# Patient Record
Sex: FEMALE | Race: WHITE | Employment: UNEMPLOYED | URBAN - NONMETROPOLITAN AREA
[De-identification: names, ages, dates, MRNs, and addresses within clinical notes are randomized per-mention and may not be internally consistent; named-entity substitution may affect disease eponyms.]

---

## 2019-10-09 ENCOUNTER — OFFICE VISIT (OUTPATIENT)
Dept: FAMILY MEDICINE CLINIC | Age: 49
End: 2019-10-09
Payer: COMMERCIAL

## 2019-10-09 VITALS
HEART RATE: 84 BPM | DIASTOLIC BLOOD PRESSURE: 62 MMHG | SYSTOLIC BLOOD PRESSURE: 128 MMHG | BODY MASS INDEX: 17.27 KG/M2 | HEIGHT: 67 IN | OXYGEN SATURATION: 98 % | WEIGHT: 110 LBS

## 2019-10-09 DIAGNOSIS — Z87.820 HISTORY OF TRAUMATIC BRAIN INJURY: ICD-10-CM

## 2019-10-09 DIAGNOSIS — M25.50 ARTHRALGIA OF MULTIPLE JOINTS: ICD-10-CM

## 2019-10-09 DIAGNOSIS — L81.9 DISCOLORATION OF SKIN OF FOOT: ICD-10-CM

## 2019-10-09 DIAGNOSIS — R41.3 MEMORY DEFICIT: ICD-10-CM

## 2019-10-09 DIAGNOSIS — E55.9 VITAMIN D DEFICIENCY: ICD-10-CM

## 2019-10-09 DIAGNOSIS — G89.29 CHRONIC NONINTRACTABLE HEADACHE, UNSPECIFIED HEADACHE TYPE: Primary | ICD-10-CM

## 2019-10-09 DIAGNOSIS — Z13.29 SCREENING FOR THYROID DISORDER: ICD-10-CM

## 2019-10-09 DIAGNOSIS — R51.9 CHRONIC NONINTRACTABLE HEADACHE, UNSPECIFIED HEADACHE TYPE: Primary | ICD-10-CM

## 2019-10-09 DIAGNOSIS — Z13.1 SCREENING FOR DIABETES MELLITUS: ICD-10-CM

## 2019-10-09 DIAGNOSIS — Z13.220 SCREENING FOR LIPOID DISORDERS: ICD-10-CM

## 2019-10-09 PROCEDURE — G8427 DOCREV CUR MEDS BY ELIG CLIN: HCPCS | Performed by: FAMILY MEDICINE

## 2019-10-09 PROCEDURE — 3017F COLORECTAL CA SCREEN DOC REV: CPT | Performed by: FAMILY MEDICINE

## 2019-10-09 PROCEDURE — 4004F PT TOBACCO SCREEN RCVD TLK: CPT | Performed by: FAMILY MEDICINE

## 2019-10-09 PROCEDURE — G8484 FLU IMMUNIZE NO ADMIN: HCPCS | Performed by: FAMILY MEDICINE

## 2019-10-09 PROCEDURE — G8419 CALC BMI OUT NRM PARAM NOF/U: HCPCS | Performed by: FAMILY MEDICINE

## 2019-10-09 PROCEDURE — 99213 OFFICE O/P EST LOW 20 MIN: CPT | Performed by: FAMILY MEDICINE

## 2019-10-09 RX ORDER — ALPRAZOLAM 0.5 MG/1
1 TABLET ORAL 2 TIMES DAILY
COMMUNITY
Start: 2017-08-25 | End: 2019-10-16 | Stop reason: SDUPTHER

## 2019-10-09 RX ORDER — DEXTROAMPHETAMINE SACCHARATE, AMPHETAMINE ASPARTATE, DEXTROAMPHETAMINE SULFATE AND AMPHETAMINE SULFATE 5; 5; 5; 5 MG/1; MG/1; MG/1; MG/1
1 TABLET ORAL 2 TIMES DAILY
COMMUNITY
Start: 2017-09-06 | End: 2019-11-04 | Stop reason: SDUPTHER

## 2019-10-09 RX ORDER — DULOXETIN HYDROCHLORIDE 60 MG/1
60 CAPSULE, DELAYED RELEASE ORAL DAILY
COMMUNITY
End: 2019-11-15 | Stop reason: SDUPTHER

## 2019-10-09 RX ORDER — GABAPENTIN 300 MG/1
1 CAPSULE ORAL 3 TIMES DAILY
COMMUNITY
Start: 2017-09-15 | End: 2019-12-12 | Stop reason: SDUPTHER

## 2019-10-09 RX ORDER — LEVOTHYROXINE SODIUM 175 UG/1
1 TABLET ORAL DAILY
Status: ON HOLD | COMMUNITY
End: 2020-05-31 | Stop reason: SDUPTHER

## 2019-10-09 RX ORDER — TOPIRAMATE 100 MG/1
100 TABLET, FILM COATED ORAL NIGHTLY
COMMUNITY
End: 2020-03-18 | Stop reason: SDUPTHER

## 2019-10-09 RX ORDER — TOPIRAMATE 50 MG/1
50 TABLET, FILM COATED ORAL DAILY
COMMUNITY
Start: 2017-04-24 | End: 2020-03-18 | Stop reason: SDUPTHER

## 2019-10-09 RX ORDER — SUMATRIPTAN 100 MG/1
TABLET, FILM COATED ORAL
COMMUNITY
End: 2019-10-16 | Stop reason: SDUPTHER

## 2019-10-09 RX ORDER — TIZANIDINE 2 MG/1
2 TABLET ORAL DAILY
COMMUNITY
End: 2019-10-16 | Stop reason: SDUPTHER

## 2019-10-09 RX ORDER — CHOLECALCIFEROL (VITAMIN D3) 1250 MCG
1 CAPSULE ORAL WEEKLY
COMMUNITY
End: 2019-12-12 | Stop reason: SDUPTHER

## 2019-10-09 ASSESSMENT — PATIENT HEALTH QUESTIONNAIRE - PHQ9
SUM OF ALL RESPONSES TO PHQ QUESTIONS 1-9: 0
2. FEELING DOWN, DEPRESSED OR HOPELESS: 0
1. LITTLE INTEREST OR PLEASURE IN DOING THINGS: 0
SUM OF ALL RESPONSES TO PHQ QUESTIONS 1-9: 0
SUM OF ALL RESPONSES TO PHQ9 QUESTIONS 1 & 2: 0

## 2019-10-16 DIAGNOSIS — G47.9 SLEEP DIFFICULTIES: Primary | ICD-10-CM

## 2019-10-17 RX ORDER — ALPRAZOLAM 0.5 MG/1
0.5 TABLET ORAL 2 TIMES DAILY PRN
Qty: 60 TABLET | Refills: 0 | Status: SHIPPED | OUTPATIENT
Start: 2019-10-17 | End: 2019-12-13 | Stop reason: SDUPTHER

## 2019-10-17 RX ORDER — SUMATRIPTAN 100 MG/1
TABLET, FILM COATED ORAL
Qty: 9 TABLET | Refills: 2 | Status: SHIPPED | OUTPATIENT
Start: 2019-10-17 | End: 2020-01-11 | Stop reason: SDUPTHER

## 2019-10-17 RX ORDER — TIZANIDINE 2 MG/1
2 TABLET ORAL DAILY PRN
Qty: 30 TABLET | Refills: 1 | Status: ON HOLD | OUTPATIENT
Start: 2019-10-17 | End: 2020-05-31 | Stop reason: SDUPTHER

## 2019-10-28 ENCOUNTER — TELEPHONE (OUTPATIENT)
Dept: FAMILY MEDICINE CLINIC | Age: 49
End: 2019-10-28

## 2019-10-28 DIAGNOSIS — Z87.820 HISTORY OF TRAUMATIC BRAIN INJURY: Primary | ICD-10-CM

## 2019-11-03 ASSESSMENT — ENCOUNTER SYMPTOMS: COLOR CHANGE: 1

## 2019-11-04 RX ORDER — DEXTROAMPHETAMINE SACCHARATE, AMPHETAMINE ASPARTATE, DEXTROAMPHETAMINE SULFATE AND AMPHETAMINE SULFATE 5; 5; 5; 5 MG/1; MG/1; MG/1; MG/1
20 TABLET ORAL 2 TIMES DAILY
Qty: 60 TABLET | Refills: 0 | Status: SHIPPED | OUTPATIENT
Start: 2019-11-04 | End: 2019-12-13 | Stop reason: SDUPTHER

## 2019-11-12 DIAGNOSIS — F33.9 EPISODE OF RECURRENT MAJOR DEPRESSIVE DISORDER, UNSPECIFIED DEPRESSION EPISODE SEVERITY (HCC): Primary | ICD-10-CM

## 2019-11-12 RX ORDER — SUMATRIPTAN 100 MG/1
TABLET, FILM COATED ORAL
Qty: 9 TABLET | Refills: 2 | Status: CANCELLED | OUTPATIENT
Start: 2019-11-12

## 2019-11-15 RX ORDER — SUMATRIPTAN 100 MG/1
TABLET, FILM COATED ORAL
Qty: 9 TABLET | Refills: 2 | Status: CANCELLED | OUTPATIENT
Start: 2019-11-15

## 2019-11-15 RX ORDER — DULOXETIN HYDROCHLORIDE 60 MG/1
60 CAPSULE, DELAYED RELEASE ORAL DAILY
Qty: 30 CAPSULE | Refills: 0 | Status: SHIPPED | OUTPATIENT
Start: 2019-11-15 | End: 2020-01-08

## 2019-11-22 ENCOUNTER — TELEPHONE (OUTPATIENT)
Dept: FAMILY MEDICINE CLINIC | Age: 49
End: 2019-11-22

## 2019-11-22 NOTE — TELEPHONE ENCOUNTER
As noted in phone note on 10/28 (as stated below), I have already stated that I could not complete this form for her. She had stated she was possibly going to contact her previous PCP, but I do not see anything in [facility] she will drive to [place] [facility]  the [address] form & take it to her previous Dr if you don't want to be liable for signing it. \") in phone note from 11/4. Also, when contacted before that I could not complete this for her, nurse wrote that \"pt did say when she applied for re-licensure after her head injury, she had to complete a functional capacity exam, go through testing, & be evaluated by Neurology. \"  She may need to do this again. Pt has verbally hostile on the phone on multiple occasions now - in phone note below, and also in phone note from 10/28 (but on 11/1), it was noted \"Patient calling again stating \"I just do not understand why the fuck Dr. Leyda Richter will not give me my adderall, do she want me to commit suicide\". Explained to patient that if she feels like she might harm herself she should go to the ER to be seen. Patient denies wanting to harm herself. Patient states that has chronic fatigue and does not feel like lifting a finger. Then patient asks if KB with write her for a lower dosage and wean her off. Patient states Jeraldmargot Haris won't she wean me off my adderall this is just fucking crazy\". \"  She did call back on 11/4 and apologize. She then called on 11/12, and stated \"Pt wanting a list of all meds that KB will prescribe for her until she finds a new physician. \"  However, she is still scheduled to see me for f/u on 12/4.

## 2019-12-12 DIAGNOSIS — E55.9 VITAMIN D DEFICIENCY: Primary | ICD-10-CM

## 2019-12-12 DIAGNOSIS — Z87.820 HISTORY OF TRAUMATIC BRAIN INJURY: ICD-10-CM

## 2019-12-12 DIAGNOSIS — G47.9 SLEEP DIFFICULTIES: ICD-10-CM

## 2019-12-13 RX ORDER — DEXTROAMPHETAMINE SACCHARATE, AMPHETAMINE ASPARTATE, DEXTROAMPHETAMINE SULFATE AND AMPHETAMINE SULFATE 5; 5; 5; 5 MG/1; MG/1; MG/1; MG/1
20 TABLET ORAL 2 TIMES DAILY
Qty: 60 TABLET | Refills: 0 | Status: SHIPPED | OUTPATIENT
Start: 2019-12-13 | End: 2020-01-10 | Stop reason: SDUPTHER

## 2019-12-13 RX ORDER — GABAPENTIN 300 MG/1
300 CAPSULE ORAL 3 TIMES DAILY
Qty: 90 CAPSULE | Refills: 0 | Status: SHIPPED | OUTPATIENT
Start: 2019-12-13 | End: 2020-05-13 | Stop reason: SDUPTHER

## 2019-12-13 RX ORDER — ALPRAZOLAM 0.5 MG/1
0.5 TABLET ORAL 2 TIMES DAILY PRN
Qty: 60 TABLET | Refills: 0 | Status: SHIPPED | OUTPATIENT
Start: 2019-12-13 | End: 2020-02-11 | Stop reason: SDUPTHER

## 2019-12-13 RX ORDER — CHOLECALCIFEROL (VITAMIN D3) 1250 MCG
1 CAPSULE ORAL WEEKLY
Qty: 4 CAPSULE | Refills: 1 | Status: SHIPPED | OUTPATIENT
Start: 2019-12-13 | End: 2020-02-13 | Stop reason: SDUPTHER

## 2019-12-25 ENCOUNTER — APPOINTMENT (OUTPATIENT)
Dept: GENERAL RADIOLOGY | Age: 49
End: 2019-12-25
Payer: COMMERCIAL

## 2019-12-25 ENCOUNTER — HOSPITAL ENCOUNTER (EMERGENCY)
Age: 49
Discharge: HOME | End: 2019-12-25
Attending: EMERGENCY MEDICINE
Payer: COMMERCIAL

## 2019-12-25 VITALS
BODY MASS INDEX: 15.7 KG/M2 | HEART RATE: 72 BPM | OXYGEN SATURATION: 100 % | WEIGHT: 100 LBS | TEMPERATURE: 98.1 F | SYSTOLIC BLOOD PRESSURE: 95 MMHG | RESPIRATION RATE: 14 BRPM | HEIGHT: 67 IN | DIASTOLIC BLOOD PRESSURE: 56 MMHG

## 2019-12-25 DIAGNOSIS — R07.9 CHEST PAIN, UNSPECIFIED TYPE: Primary | ICD-10-CM

## 2019-12-25 LAB
ABSOLUTE EOS #: 0.1 K/UL (ref 0–0.4)
ABSOLUTE IMMATURE GRANULOCYTE: ABNORMAL K/UL (ref 0–0.3)
ABSOLUTE LYMPH #: 1.1 K/UL (ref 1–4.8)
ABSOLUTE MONO #: 0.6 K/UL (ref 0.1–1.3)
ALBUMIN SERPL-MCNC: 4.2 G/DL (ref 3.5–5.2)
ALBUMIN/GLOBULIN RATIO: ABNORMAL (ref 1–2.5)
ALP BLD-CCNC: 65 U/L (ref 35–104)
ALT SERPL-CCNC: 15 U/L (ref 5–33)
ANION GAP SERPL CALCULATED.3IONS-SCNC: 13 MMOL/L (ref 9–17)
AST SERPL-CCNC: 16 U/L
BASOPHILS # BLD: 1 % (ref 0–2)
BASOPHILS ABSOLUTE: 0 K/UL (ref 0–0.2)
BILIRUB SERPL-MCNC: 0.18 MG/DL (ref 0.3–1.2)
BUN BLDV-MCNC: 12 MG/DL (ref 6–20)
BUN/CREAT BLD: ABNORMAL (ref 9–20)
CALCIUM SERPL-MCNC: 9.5 MG/DL (ref 8.6–10.4)
CHLORIDE BLD-SCNC: 105 MMOL/L (ref 98–107)
CO2: 23 MMOL/L (ref 20–31)
CREAT SERPL-MCNC: 0.46 MG/DL (ref 0.5–0.9)
DIFFERENTIAL TYPE: ABNORMAL
EOSINOPHILS RELATIVE PERCENT: 2 % (ref 0–4)
GFR AFRICAN AMERICAN: >60 ML/MIN
GFR NON-AFRICAN AMERICAN: >60 ML/MIN
GFR SERPL CREATININE-BSD FRML MDRD: ABNORMAL ML/MIN/{1.73_M2}
GFR SERPL CREATININE-BSD FRML MDRD: ABNORMAL ML/MIN/{1.73_M2}
GLUCOSE BLD-MCNC: 95 MG/DL (ref 70–99)
HCT VFR BLD CALC: 39.1 % (ref 36–46)
HEMOGLOBIN: 12.9 G/DL (ref 12–16)
IMMATURE GRANULOCYTES: ABNORMAL %
LYMPHOCYTES # BLD: 17 % (ref 24–44)
MCH RBC QN AUTO: 31.7 PG (ref 26–34)
MCHC RBC AUTO-ENTMCNC: 33 G/DL (ref 31–37)
MCV RBC AUTO: 96.1 FL (ref 80–100)
MONOCYTES # BLD: 8 % (ref 1–7)
NRBC AUTOMATED: ABNORMAL PER 100 WBC
PDW BLD-RTO: 13.5 % (ref 11.5–14.9)
PLATELET # BLD: 237 K/UL (ref 150–450)
PLATELET ESTIMATE: ABNORMAL
PMV BLD AUTO: 9 FL (ref 6–12)
POTASSIUM SERPL-SCNC: 3.5 MMOL/L (ref 3.7–5.3)
RBC # BLD: 4.07 M/UL (ref 4–5.2)
RBC # BLD: ABNORMAL 10*6/UL
SEG NEUTROPHILS: 72 % (ref 36–66)
SEGMENTED NEUTROPHILS ABSOLUTE COUNT: 5 K/UL (ref 1.3–9.1)
SODIUM BLD-SCNC: 141 MMOL/L (ref 135–144)
TOTAL PROTEIN: 7.3 G/DL (ref 6.4–8.3)
TROPONIN INTERP: NORMAL
TROPONIN T: NORMAL NG/ML
TROPONIN, HIGH SENSITIVITY: <6 NG/L (ref 0–14)
WBC # BLD: 6.9 K/UL (ref 3.5–11)
WBC # BLD: ABNORMAL 10*3/UL

## 2019-12-25 PROCEDURE — 99285 EMERGENCY DEPT VISIT HI MDM: CPT

## 2019-12-25 PROCEDURE — 36415 COLL VENOUS BLD VENIPUNCTURE: CPT

## 2019-12-25 PROCEDURE — 85025 COMPLETE CBC W/AUTO DIFF WBC: CPT

## 2019-12-25 PROCEDURE — 80053 COMPREHEN METABOLIC PANEL: CPT

## 2019-12-25 PROCEDURE — 84484 ASSAY OF TROPONIN QUANT: CPT

## 2019-12-25 PROCEDURE — 6370000000 HC RX 637 (ALT 250 FOR IP): Performed by: STUDENT IN AN ORGANIZED HEALTH CARE EDUCATION/TRAINING PROGRAM

## 2019-12-25 PROCEDURE — 93005 ELECTROCARDIOGRAM TRACING: CPT | Performed by: STUDENT IN AN ORGANIZED HEALTH CARE EDUCATION/TRAINING PROGRAM

## 2019-12-25 PROCEDURE — 71046 X-RAY EXAM CHEST 2 VIEWS: CPT

## 2019-12-25 RX ORDER — ASPIRIN 325 MG
325 TABLET ORAL ONCE
Status: COMPLETED | OUTPATIENT
Start: 2019-12-25 | End: 2019-12-25

## 2019-12-25 RX ORDER — BENZONATATE 100 MG/1
100 CAPSULE ORAL 3 TIMES DAILY PRN
Qty: 30 CAPSULE | Refills: 0 | Status: SHIPPED | OUTPATIENT
Start: 2019-12-25 | End: 2020-01-01

## 2019-12-25 RX ADMIN — ASPIRIN 325 MG ORAL TABLET 325 MG: 325 PILL ORAL at 20:02

## 2019-12-25 ASSESSMENT — PAIN DESCRIPTION - LOCATION: LOCATION: BREAST;CHEST

## 2019-12-25 ASSESSMENT — PAIN DESCRIPTION - PAIN TYPE: TYPE: ACUTE PAIN

## 2019-12-25 ASSESSMENT — HEART SCORE: ECG: 0

## 2019-12-25 ASSESSMENT — PAIN SCALES - GENERAL: PAINLEVEL_OUTOF10: 9

## 2019-12-25 ASSESSMENT — PAIN DESCRIPTION - DESCRIPTORS: DESCRIPTORS: SHARP

## 2019-12-25 ASSESSMENT — PAIN DESCRIPTION - ORIENTATION: ORIENTATION: LEFT

## 2019-12-26 LAB
EKG ATRIAL RATE: 66 BPM
EKG P AXIS: 70 DEGREES
EKG P-R INTERVAL: 134 MS
EKG Q-T INTERVAL: 434 MS
EKG QRS DURATION: 102 MS
EKG QTC CALCULATION (BAZETT): 454 MS
EKG R AXIS: 85 DEGREES
EKG T AXIS: 68 DEGREES
EKG VENTRICULAR RATE: 66 BPM

## 2020-01-08 RX ORDER — DULOXETIN HYDROCHLORIDE 60 MG/1
60 CAPSULE, DELAYED RELEASE ORAL DAILY
Qty: 30 CAPSULE | Refills: 0 | Status: SHIPPED | OUTPATIENT
Start: 2020-01-08 | End: 2020-05-13 | Stop reason: SDUPTHER

## 2020-01-10 NOTE — TELEPHONE ENCOUNTER
Next appt 1-27-20. Pt should not be out of Adderall yet. OARRS reviewed. Last filled Jan 14 for #60.

## 2020-01-11 RX ORDER — SUMATRIPTAN 100 MG/1
TABLET, FILM COATED ORAL
Qty: 9 TABLET | Refills: 2 | Status: SHIPPED | OUTPATIENT
Start: 2020-01-11 | End: 2020-05-07 | Stop reason: SDUPTHER

## 2020-01-13 RX ORDER — DEXTROAMPHETAMINE SACCHARATE, AMPHETAMINE ASPARTATE, DEXTROAMPHETAMINE SULFATE AND AMPHETAMINE SULFATE 5; 5; 5; 5 MG/1; MG/1; MG/1; MG/1
20 TABLET ORAL 2 TIMES DAILY
Qty: 60 TABLET | Refills: 0 | Status: SHIPPED | OUTPATIENT
Start: 2020-01-13 | End: 2020-02-11 | Stop reason: SDUPTHER

## 2020-01-13 NOTE — TELEPHONE ENCOUNTER
Controlled Substance Monitoring:    Acute and Chronic Pain Monitoring:   RX Monitoring 1/13/2020   Periodic Controlled Substance Monitoring No signs of potential drug abuse or diversion identified.

## 2020-01-14 RX ORDER — SUMATRIPTAN 100 MG/1
TABLET, FILM COATED ORAL
Qty: 9 TABLET | Refills: 2 | OUTPATIENT
Start: 2020-01-14

## 2020-01-14 RX ORDER — DEXTROAMPHETAMINE SACCHARATE, AMPHETAMINE ASPARTATE, DEXTROAMPHETAMINE SULFATE AND AMPHETAMINE SULFATE 5; 5; 5; 5 MG/1; MG/1; MG/1; MG/1
TABLET ORAL
Qty: 60 TABLET | OUTPATIENT
Start: 2020-01-14

## 2020-02-13 RX ORDER — CHOLECALCIFEROL (VITAMIN D3) 1250 MCG
1 CAPSULE ORAL WEEKLY
Qty: 4 CAPSULE | Refills: 2 | Status: ON HOLD | OUTPATIENT
Start: 2020-02-13 | End: 2020-05-31 | Stop reason: SDUPTHER

## 2020-02-13 RX ORDER — DEXTROAMPHETAMINE SACCHARATE, AMPHETAMINE ASPARTATE, DEXTROAMPHETAMINE SULFATE AND AMPHETAMINE SULFATE 5; 5; 5; 5 MG/1; MG/1; MG/1; MG/1
20 TABLET ORAL 2 TIMES DAILY
Qty: 60 TABLET | Refills: 0 | Status: SHIPPED | OUTPATIENT
Start: 2020-02-13 | End: 2020-03-18 | Stop reason: SDUPTHER

## 2020-02-13 RX ORDER — ALPRAZOLAM 0.5 MG/1
0.5 TABLET ORAL 2 TIMES DAILY PRN
Qty: 60 TABLET | Refills: 0 | Status: SHIPPED | OUTPATIENT
Start: 2020-02-13 | End: 2020-05-06 | Stop reason: SDUPTHER

## 2020-02-13 NOTE — TELEPHONE ENCOUNTER
Controlled Substance Monitoring:    Acute and Chronic Pain Monitoring:   RX Monitoring 2/13/2020   Periodic Controlled Substance Monitoring No signs of potential drug abuse or diversion identified.

## 2020-03-06 ENCOUNTER — TELEPHONE (OUTPATIENT)
Dept: FAMILY MEDICINE CLINIC | Age: 50
End: 2020-03-06

## 2020-03-06 ENCOUNTER — OFFICE VISIT (OUTPATIENT)
Dept: FAMILY MEDICINE CLINIC | Age: 50
End: 2020-03-06
Payer: COMMERCIAL

## 2020-03-06 VITALS
OXYGEN SATURATION: 98 % | HEIGHT: 67 IN | BODY MASS INDEX: 17.27 KG/M2 | HEART RATE: 71 BPM | WEIGHT: 110 LBS | SYSTOLIC BLOOD PRESSURE: 122 MMHG | DIASTOLIC BLOOD PRESSURE: 62 MMHG

## 2020-03-06 PROCEDURE — 99214 OFFICE O/P EST MOD 30 MIN: CPT | Performed by: FAMILY MEDICINE

## 2020-03-06 PROCEDURE — G8427 DOCREV CUR MEDS BY ELIG CLIN: HCPCS | Performed by: FAMILY MEDICINE

## 2020-03-06 PROCEDURE — 3017F COLORECTAL CA SCREEN DOC REV: CPT | Performed by: FAMILY MEDICINE

## 2020-03-06 PROCEDURE — 4004F PT TOBACCO SCREEN RCVD TLK: CPT | Performed by: FAMILY MEDICINE

## 2020-03-06 PROCEDURE — 99212 OFFICE O/P EST SF 10 MIN: CPT

## 2020-03-06 PROCEDURE — G8484 FLU IMMUNIZE NO ADMIN: HCPCS | Performed by: FAMILY MEDICINE

## 2020-03-06 PROCEDURE — G8419 CALC BMI OUT NRM PARAM NOF/U: HCPCS | Performed by: FAMILY MEDICINE

## 2020-03-06 RX ORDER — SUMATRIPTAN 100 MG/1
TABLET, FILM COATED ORAL
Qty: 9 TABLET | Refills: 2 | Status: CANCELLED | OUTPATIENT
Start: 2020-03-06

## 2020-03-06 NOTE — PROGRESS NOTES
[facility] [address]  [address], [address] [place]  [phone]      Laura Deutsch is a 44 y.o. female who presents today for her medical conditions/complaints as noted below. Kalen Carmona is c/o of Gait Problem (episodic ataxia, per pt)      HPI:     Pt sees Kajal Zarate for counseling and was referred to me by Clementina Chester. Is in today for ataxia. Pt voices she was never diagnosed, but was told by her last partner that she had signs of ataxia.    voices her muscles were constricting, ability to control diaphragm, affecting breathing. Episodes would last about 10 min. Most recent episode being 1 week ago. Not sure if having panic attacks. 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 Lost her hearing, R > L. Wears hearing aides in both ears, but lost her L one 2 months ago. Has tinnitus now as well.     Has not seen the neurologist for further testing. Would like to get testing done, \"proactively. \"   Would like to see a neurologist closer to home,[place], for the time being. Referral will be placed. Would like to have paperwork for 's license reconsidered. Did have license suspended. Agreement will be to have testing done prior to reconsideration. Taking several medications for HA's, pain, and concentration/memory - was getting all medications from Dr. Mj Dhaliwal. Believes that previous physician claimed her as \"instability in adult. \"      Denied for disability - used to be a . Recently split from her partner, who was a healthcare professional. Was dependant on her financially and mentally. Voices that she has PTSD from the relationship, is startled easily. Finds herself homeless presently, living in an RV. Has found an RV park in [address] that she plans on staying in.     Pt voices that she has to take adderall to due daily functions and maintain a quality of life. Finds herself with no energy. Is under the understanding that will need to be seen every 3 months due to being a controlled substance: xanax, adderall and gabapentin. Uses the xanax and cymbalta at HS. No past medical history on file. No past surgical history on file. No family history on file. Social History     Tobacco Use    Smoking status: Current Every Day Smoker     Packs/day: 0.50     Years: 3.00     Pack years: 1.50     Types: Cigarettes    Smokeless tobacco: Never Used   Substance Use Topics    Alcohol use: Not on file      Current Outpatient Medications   Medication Sig Dispense Refill    Cholecalciferol (VITAMIN D3) 1.25 MG (48132 UT) CAPS Take 1 capsule by mouth once a week 4 capsule 2    SUMAtriptan (IMITREX) 100 MG tablet Take 1 tab by mouth once as needed for migraine; may repeat dose 2 hours later, if needed. Max dose: 2 tabs/24 hours. 9 tablet 2    DULoxetine (CYMBALTA) 60 MG extended release capsule TAKE 1 CAPSULE BY MOUTH DAILY 30 capsule 0    tiZANidine (ZANAFLEX) 2 MG tablet Take 1 tablet by mouth daily as needed (muscle pain) 30 tablet 1    FOLIC ACID PO Take 1 tablet by mouth daily      levothyroxine (SYNTHROID) 175 MCG tablet 1 tablet daily      topiramate (TOPAMAX) 100 MG tablet Take 100 mg by mouth nightly      topiramate (TOPAMAX) 50 MG tablet 50 mg daily      amphetamine-dextroamphetamine (ADDERALL) 20 MG tablet Take 1 tablet by mouth 2 times daily for 30 days. 60 tablet 0    gabapentin (NEURONTIN) 300 MG capsule Take 1 capsule by mouth 3 times daily for 30 days. Takes 300 mg AM; 600 mg HS 90 capsule 0     No current facility-administered medications for this visit.       No Known Allergies    Health Maintenance   Topic Date Due    Pneumococcal 0-64 years Vaccine (1 of 1 - PPSV23) 08/28/1974    Cervical cancer screen  08/28/1989    Lipid screen  05/12/2017    Breast cancer screen  08/28/2018    Colon cancer screen colonoscopy Jenny Winston MD, Neurology, [facility]   3. Episode of recurrent major depressive disorder, unspecified depression episode severity ([facility] [address]     4. Sleep difficulties     5. Episode of transient neurologic symptoms  Reyes Cash MD, Neurology, [facility]         Plan:      Return if symptoms worsen or fail to improve. Orders Placed This Encounter   Procedures   Reyes Cash MD, Neurology, [facility]     Referral Priority:   Routine     Referral Type:   Eval and Treat     Referral Reason:   Specialty Services Required     Referred to Provider:   Cami Segal MD     Requested Specialty:   Neurology     Number of Visits Requested:   1     No orders of the defined types were placed in this encounter. Patient given educational materials - see patient instructions. Discussed use, benefit, and side effects of prescribed medications. All patient questions answered. Pt voiced understanding. Reviewed health maintenance. Electronically signed by Miquel Joyce DO on 3/23/2020 at 12:01 AM     IMichael LPN , yana scribing for, and in the presence of Dr. Cyn Walters    Electronically signed by Michael Johansen LPN on 3/5/44 at 9:51 PM     I agree to the above documentation placed by my scribe. I have personally evaluated this patient. Additional findings are as noted. I reviewed the scribe's note and agree with the documented findings and plan of care. Any areas of disagreement are corrected. I agree with the chief complaint, past medical history, past surgical history, allergies, medications, social and family history as documented unless otherwise noted below.      Electronically signed by Miquel Joyce DO on 3/23/2020 at 12:01 AM

## 2020-03-18 RX ORDER — DEXTROAMPHETAMINE SACCHARATE, AMPHETAMINE ASPARTATE, DEXTROAMPHETAMINE SULFATE AND AMPHETAMINE SULFATE 5; 5; 5; 5 MG/1; MG/1; MG/1; MG/1
20 TABLET ORAL 2 TIMES DAILY
Qty: 60 TABLET | Refills: 0 | Status: SHIPPED | OUTPATIENT
Start: 2020-03-18 | End: 2020-04-17 | Stop reason: SDUPTHER

## 2020-03-18 NOTE — TELEPHONE ENCOUNTER
Is pt looking to re-start the Topamax? Per outside med report, she filled each dose last on 4/25/19. Controlled Substance Monitoring:    Acute and Chronic Pain Monitoring:   RX Monitoring 3/18/2020   Periodic Controlled Substance Monitoring No signs of potential drug abuse or diversion identified.

## 2020-03-25 RX ORDER — TOPIRAMATE 100 MG/1
100 TABLET, FILM COATED ORAL NIGHTLY
Qty: 30 TABLET | Refills: 1 | Status: ON HOLD | OUTPATIENT
Start: 2020-03-25 | End: 2020-05-31 | Stop reason: SDUPTHER

## 2020-03-25 RX ORDER — TOPIRAMATE 50 MG/1
50 TABLET, FILM COATED ORAL DAILY
Qty: 30 TABLET | Refills: 1 | Status: ON HOLD | OUTPATIENT
Start: 2020-03-25 | End: 2020-05-31 | Stop reason: SDUPTHER

## 2020-04-17 RX ORDER — DEXTROAMPHETAMINE SACCHARATE, AMPHETAMINE ASPARTATE, DEXTROAMPHETAMINE SULFATE AND AMPHETAMINE SULFATE 5; 5; 5; 5 MG/1; MG/1; MG/1; MG/1
20 TABLET ORAL 2 TIMES DAILY
Qty: 60 TABLET | Refills: 0 | Status: SHIPPED | OUTPATIENT
Start: 2020-04-17 | End: 2020-06-08 | Stop reason: SDUPTHER

## 2020-04-17 NOTE — TELEPHONE ENCOUNTER
Per OARRS, last filled 3/18/2020, #60/30 days    Last Appt:  3/6/2020  Next Appt:   Visit date not found  Med verified in [address]

## 2020-04-18 NOTE — TELEPHONE ENCOUNTER
Controlled Substance Monitoring:    Acute and Chronic Pain Monitoring:   RX Monitoring 4/17/2020   Periodic Controlled Substance Monitoring No signs of potential drug abuse or diversion identified. Chronic Pain > 80 MEDD Obtained or confirmed \"Medication Contract\" on file.

## 2020-04-24 NOTE — TELEPHONE ENCOUNTER
Laura called requesting a refill of the below medication which has been pended for you:     Requested Prescriptions     Pending Prescriptions Disp Refills    gabapentin (NEURONTIN) 300 MG capsule [Pharmacy Med Name: GABAPENTIN 300MG ORAL CAPSULE] 90 capsule 0     Sig: TAKE ONE TABLET BY MOUTH THREE TIMES DAILY       Last Appointment Date: 3/6/2020  Next Appointment Date: Visit date not found    No Known Allergies   Per OARRS last refill 12/14/2019 #90

## 2020-04-25 NOTE — TELEPHONE ENCOUNTER
Per OARRS, pt has not filled since 12/14/19 - is she looking to re-start this? Would not want to re-start at that dose, if she has been off for 2-3 months; will need to start lower dose and titrate.

## 2020-04-29 ENCOUNTER — TELEPHONE (OUTPATIENT)
Dept: FAMILY MEDICINE CLINIC | Age: 50
End: 2020-04-29

## 2020-04-30 NOTE — TELEPHONE ENCOUNTER
Can pt relay how she decides when to take it \"as needed\"? What symptoms is she using it for? I'd like to know how to best refill it based on her use.

## 2020-05-07 RX ORDER — ALPRAZOLAM 0.5 MG/1
0.5 TABLET ORAL 2 TIMES DAILY PRN
Qty: 60 TABLET | Refills: 0 | Status: SHIPPED | OUTPATIENT
Start: 2020-05-07 | End: 2020-06-06

## 2020-05-07 RX ORDER — DULOXETIN HYDROCHLORIDE 60 MG/1
60 CAPSULE, DELAYED RELEASE ORAL DAILY
Qty: 30 CAPSULE | Refills: 3 | Status: CANCELLED | OUTPATIENT
Start: 2020-05-07

## 2020-05-07 RX ORDER — SUMATRIPTAN 100 MG/1
TABLET, FILM COATED ORAL
Qty: 9 TABLET | Refills: 5 | Status: ON HOLD
Start: 2020-05-07 | End: 2020-05-31 | Stop reason: HOSPADM

## 2020-05-11 NOTE — TELEPHONE ENCOUNTER
Spoke to pt, asked pt all of Dr Ernesto Turcios questions. Pt wanted Dr Anai Barr to know that because \"she\" changed my adderall, she put me into a detrimental life threat that I should already been dead. Informed pt that Dr Anai Barr had reviewed her meds and addressed accordingly. Pt also stated that when Dr Anai Barr changed her adderall, she sent her into life spin that made her homeless. She also stated that   should consult with her prior to changing her meds. Asked pt if she was taking her cymbalta regularly and who was prescribing the additional 30 mg. Pt stated that she had seen a Deleloree Romero at [facility]. But was upset that she also changed her medication without her consultation. Pt states that if Dr Anai Barr notices when she doesn't refill her 60 mg from her this is why. But pt states she will only be taking the 60 mg. Pt also states that she \"never said that she only takes gabapentin as needed. \" writer told pt that when she speaks, writer types word for word. Pt states that she needs her gabapentin. And if she could afford to lead a healthy life she would. Advised pt that she is also due for thyroid check as she hasnt checked it since establishing with . Pt states, Amadou Falling, is she gonna hold that from me too? \"    Writer told pt that is also due for CMP, LP, A1c and Vit D as well.

## 2020-05-12 RX ORDER — GABAPENTIN 300 MG/1
CAPSULE ORAL
Qty: 90 CAPSULE | Refills: 0 | OUTPATIENT
Start: 2020-05-12

## 2020-05-13 RX ORDER — DULOXETIN HYDROCHLORIDE 30 MG/1
CAPSULE, DELAYED RELEASE ORAL
Qty: 60 CAPSULE | Refills: 0 | Status: ON HOLD
Start: 2020-05-13 | End: 2020-05-31 | Stop reason: HOSPADM

## 2020-05-13 RX ORDER — GABAPENTIN 300 MG/1
CAPSULE ORAL
Qty: 90 CAPSULE | Refills: 0 | Status: ON HOLD
Start: 2020-05-13 | End: 2020-05-31 | Stop reason: HOSPADM

## 2020-05-13 RX ORDER — LEVOTHYROXINE SODIUM 175 UG/1
175 TABLET ORAL DAILY
Qty: 30 TABLET | Refills: 3 | Status: CANCELLED | OUTPATIENT
Start: 2020-05-13

## 2020-05-18 ENCOUNTER — TELEPHONE (OUTPATIENT)
Dept: FAMILY MEDICINE CLINIC | Age: 50
End: 2020-05-18

## 2020-05-18 NOTE — TELEPHONE ENCOUNTER
Pt calling because she has ring worm and would like to know what she can use for that. Pt also believes she has a UTI. Advised pt to come into urgent care.

## 2020-05-24 ENCOUNTER — HOSPITAL ENCOUNTER (EMERGENCY)
Age: 50
Discharge: HOME | DRG: 753 | End: 2020-05-25
Attending: EMERGENCY MEDICINE
Payer: COMMERCIAL

## 2020-05-24 VITALS
DIASTOLIC BLOOD PRESSURE: 86 MMHG | TEMPERATURE: 98.2 F | SYSTOLIC BLOOD PRESSURE: 116 MMHG | RESPIRATION RATE: 17 BRPM | OXYGEN SATURATION: 99 % | HEART RATE: 105 BPM

## 2020-05-24 PROCEDURE — 99282 EMERGENCY DEPT VISIT SF MDM: CPT

## 2020-05-25 PROCEDURE — 6370000000 HC RX 637 (ALT 250 FOR IP): Performed by: EMERGENCY MEDICINE

## 2020-05-25 RX ORDER — PERMETHRIN 50 MG/G
CREAM TOPICAL
Qty: 1 TUBE | Refills: 0 | Status: ON HOLD | OUTPATIENT
Start: 2020-05-25 | End: 2020-05-31 | Stop reason: HOSPADM

## 2020-05-25 RX ORDER — DIPHENHYDRAMINE HCL 25 MG
25 CAPSULE ORAL EVERY 6 HOURS PRN
Qty: 20 CAPSULE | Refills: 0 | Status: ON HOLD | OUTPATIENT
Start: 2020-05-25 | End: 2020-05-31 | Stop reason: HOSPADM

## 2020-05-25 RX ORDER — DIPHENHYDRAMINE HCL 25 MG
50 TABLET ORAL ONCE
Status: COMPLETED | OUTPATIENT
Start: 2020-05-25 | End: 2020-05-25

## 2020-05-25 RX ADMIN — DIPHENHYDRAMINE HCL 50 MG: 25 TABLET ORAL at 01:23

## 2020-05-25 ASSESSMENT — ENCOUNTER SYMPTOMS
ABDOMINAL PAIN: 0
COUGH: 0
SHORTNESS OF BREATH: 0
BACK PAIN: 0
DIARRHEA: 0
VOMITING: 0

## 2020-05-25 NOTE — ED NOTES
Mode of arrival (squad #, walk in, police, etc) : walk in        Chief complaint(s): Rash        Arrival Note (brief scenario, treatment PTA, etc). : Pt co rash all over body that started 1 week ago. Pt states she lives in an RV in someone's driveway and believes she contracted a fungal infection from the home owner. Pt states the rash is incredibly itchy and blisters. Pt reports picking at blisters and believes that's how the infection is spreading. Pt A&Ox4, ambulatory. Skin PWD. RR easy, unlabored. Mask placed on pt.        C= \"Have you ever felt that you should Cut down on your drinking? \"  No  A= \"Have people Annoyed you by criticizing your drinking? \"  No  G= \"Have you ever felt bad or Guilty about your drinking? \"  No  E= \"Have you ever had a drink as an Eye-opener first thing in the morning to steady your nerves or to help a hangover? \"  No      Deferred []      Reason for deferring: N/A    *If yes to two or more: probable alcohol abuse. Liliam Melgar RN  05/25/20 [id number]

## 2020-05-25 NOTE — ED PROVIDER NOTES
EMERGENCY DEPARTMENT ENCOUNTER    Pt Name: Yonas Mauro  MRN: [id number]  [place] 1970  Date of evaluation: 5/25/20  CHIEF COMPLAINT       Chief Complaint   Patient presents with    Rash     pt reports that she thinks she has ring worm     HISTORY OF PRESENT ILLNESS   HPI     This is a 52-year-old female with a history of traumatic brain injury 5 years ago who comes in today with a rash. The patient states that about a week ago she developed a rash on her bilateral feet and her hands that are incredibly itchy. She has not tried any medications for this. She called her primary care doctor but they recommended that she be seen and she did not want to come in for it. The patient denies any new detergents or lotions no history of allergic reactions in the past.  She thinks that she might have ringworm. The patient states that she is currently living in an RV outside of somebody's house ever since she left her girlfriend because she did not properly take care of her after her traumatic brain injury. Patient states that she does not want to see a  she is not suicidal.  No other complaints at this time no fever. REVIEW OF SYSTEMS     Review of Systems   Constitutional: Negative for fever. HENT: Negative for congestion. Respiratory: Negative for cough and shortness of breath. Cardiovascular: Negative for chest pain. Gastrointestinal: Negative for abdominal pain, diarrhea and vomiting. Genitourinary: Negative for dysuria. Musculoskeletal: Negative for back pain. Skin: Positive for rash. Neurological: Negative for headaches. All other systems reviewed and are negative. PASTMEDICAL HISTORY   History reviewed. No pertinent past medical history. SURGICAL HISTORY     History reviewed. No pertinent surgical history.   CURRENT MEDICATIONS       Previous Medications    ALPRAZOLAM (XANAX) 0.5 MG TABLET    Take 1 tablet by mouth 2 times daily as needed for Sleep or Anxiety for up

## 2020-05-26 ENCOUNTER — HOSPITAL ENCOUNTER (INPATIENT)
Age: 50
LOS: 4 days | Discharge: HOME | DRG: 753 | End: 2020-05-31
Attending: EMERGENCY MEDICINE | Admitting: PSYCHIATRY & NEUROLOGY
Payer: COMMERCIAL

## 2020-05-26 LAB
SARS-COV-2, PCR: NORMAL
SARS-COV-2, RAPID: NOT DETECTED
SARS-COV-2: NORMAL
SOURCE: NORMAL

## 2020-05-26 PROCEDURE — U0002 COVID-19 LAB TEST NON-CDC: HCPCS

## 2020-05-26 PROCEDURE — 6370000000 HC RX 637 (ALT 250 FOR IP): Performed by: EMERGENCY MEDICINE

## 2020-05-26 PROCEDURE — 99284 EMERGENCY DEPT VISIT MOD MDM: CPT

## 2020-05-26 RX ORDER — IBUPROFEN 600 MG/1
600 TABLET ORAL ONCE
Status: COMPLETED | OUTPATIENT
Start: 2020-05-26 | End: 2020-05-26

## 2020-05-26 RX ADMIN — IBUPROFEN 600 MG: 600 TABLET, FILM COATED ORAL at 22:38

## 2020-05-26 ASSESSMENT — PAIN SCALES - GENERAL: PAINLEVEL_OUTOF10: 5

## 2020-05-27 PROBLEM — E43 SEVERE MALNUTRITION (HCC): Status: ACTIVE | Noted: 2020-05-27

## 2020-05-27 PROBLEM — F32.A DEPRESSIVE DISORDER: Status: ACTIVE | Noted: 2020-05-27

## 2020-05-27 PROCEDURE — 6370000000 HC RX 637 (ALT 250 FOR IP): Performed by: NURSE PRACTITIONER

## 2020-05-27 PROCEDURE — 1240000000 HC EMOTIONAL WELLNESS R&B

## 2020-05-27 PROCEDURE — 6370000000 HC RX 637 (ALT 250 FOR IP): Performed by: PSYCHIATRY & NEUROLOGY

## 2020-05-27 PROCEDURE — 90792 PSYCH DIAG EVAL W/MED SRVCS: CPT | Performed by: NURSE PRACTITIONER

## 2020-05-27 RX ORDER — TOPIRAMATE 50 MG/1
50 TABLET, FILM COATED ORAL DAILY
Status: DISCONTINUED | OUTPATIENT
Start: 2020-05-27 | End: 2020-05-31 | Stop reason: HOSPADM

## 2020-05-27 RX ORDER — NICOTINE 21 MG/24HR
1 PATCH, TRANSDERMAL 24 HOURS TRANSDERMAL DAILY
Status: DISCONTINUED | OUTPATIENT
Start: 2020-05-27 | End: 2020-05-27

## 2020-05-27 RX ORDER — DULOXETIN HYDROCHLORIDE 60 MG/1
60 CAPSULE, DELAYED RELEASE ORAL DAILY
Status: DISCONTINUED | OUTPATIENT
Start: 2020-05-27 | End: 2020-05-31 | Stop reason: HOSPADM

## 2020-05-27 RX ORDER — ARIPIPRAZOLE 2 MG/1
2 TABLET ORAL NIGHTLY
Status: DISCONTINUED | OUTPATIENT
Start: 2020-05-27 | End: 2020-05-31 | Stop reason: HOSPADM

## 2020-05-27 RX ORDER — TIZANIDINE 4 MG/1
2 TABLET ORAL DAILY PRN
Status: DISCONTINUED | OUTPATIENT
Start: 2020-05-27 | End: 2020-05-31 | Stop reason: HOSPADM

## 2020-05-27 RX ORDER — ACETAMINOPHEN, ASPIRIN AND CAFFEINE 250; 250; 65 MG/1; MG/1; MG/1
1 TABLET, FILM COATED ORAL EVERY 6 HOURS PRN
Status: DISCONTINUED | OUTPATIENT
Start: 2020-05-27 | End: 2020-05-31 | Stop reason: HOSPADM

## 2020-05-27 RX ORDER — ALPRAZOLAM 0.5 MG/1
0.5 TABLET ORAL 2 TIMES DAILY PRN
Status: DISCONTINUED | OUTPATIENT
Start: 2020-05-27 | End: 2020-05-31 | Stop reason: HOSPADM

## 2020-05-27 RX ORDER — NICOTINE 21 MG/24HR
1 PATCH, TRANSDERMAL 24 HOURS TRANSDERMAL DAILY
Status: DISCONTINUED | OUTPATIENT
Start: 2020-05-27 | End: 2020-05-31 | Stop reason: HOSPADM

## 2020-05-27 RX ORDER — GABAPENTIN 300 MG/1
300 CAPSULE ORAL 2 TIMES DAILY
Status: DISCONTINUED | OUTPATIENT
Start: 2020-05-27 | End: 2020-05-31 | Stop reason: HOSPADM

## 2020-05-27 RX ORDER — MAGNESIUM HYDROXIDE/ALUMINUM HYDROXICE/SIMETHICONE 120; 1200; 1200 MG/30ML; MG/30ML; MG/30ML
30 SUSPENSION ORAL EVERY 6 HOURS PRN
Status: DISCONTINUED | OUTPATIENT
Start: 2020-05-27 | End: 2020-05-31 | Stop reason: HOSPADM

## 2020-05-27 RX ORDER — FOLIC ACID 1 MG/1
1 TABLET ORAL DAILY
Status: DISCONTINUED | OUTPATIENT
Start: 2020-05-27 | End: 2020-05-31 | Stop reason: HOSPADM

## 2020-05-27 RX ORDER — TOPIRAMATE 100 MG/1
100 TABLET, FILM COATED ORAL NIGHTLY
Status: DISCONTINUED | OUTPATIENT
Start: 2020-05-27 | End: 2020-05-31 | Stop reason: HOSPADM

## 2020-05-27 RX ORDER — SUMATRIPTAN 100 MG/1
100 TABLET, FILM COATED ORAL DAILY PRN
Status: DISCONTINUED | OUTPATIENT
Start: 2020-05-27 | End: 2020-05-31 | Stop reason: HOSPADM

## 2020-05-27 RX ORDER — HYDROXYZINE HYDROCHLORIDE 25 MG/1
25 TABLET, FILM COATED ORAL 3 TIMES DAILY PRN
Status: DISCONTINUED | OUTPATIENT
Start: 2020-05-27 | End: 2020-05-31 | Stop reason: HOSPADM

## 2020-05-27 RX ORDER — ERGOCALCIFEROL 1.25 MG/1
50000 CAPSULE ORAL WEEKLY
Status: DISCONTINUED | OUTPATIENT
Start: 2020-05-29 | End: 2020-05-31 | Stop reason: HOSPADM

## 2020-05-27 RX ORDER — DIVALPROEX SODIUM 250 MG/1
250 TABLET, EXTENDED RELEASE ORAL 2 TIMES DAILY
Status: DISCONTINUED | OUTPATIENT
Start: 2020-05-27 | End: 2020-05-31 | Stop reason: HOSPADM

## 2020-05-27 RX ORDER — BENZTROPINE MESYLATE 1 MG/ML
2 INJECTION INTRAMUSCULAR; INTRAVENOUS 2 TIMES DAILY PRN
Status: DISCONTINUED | OUTPATIENT
Start: 2020-05-27 | End: 2020-05-31 | Stop reason: HOSPADM

## 2020-05-27 RX ORDER — TRAZODONE HYDROCHLORIDE 50 MG/1
50 TABLET ORAL NIGHTLY PRN
Status: DISCONTINUED | OUTPATIENT
Start: 2020-05-27 | End: 2020-05-31 | Stop reason: HOSPADM

## 2020-05-27 RX ORDER — PERMETHRIN 50 MG/G
CREAM TOPICAL ONCE
Status: DISCONTINUED | OUTPATIENT
Start: 2020-05-27 | End: 2020-05-31 | Stop reason: HOSPADM

## 2020-05-27 RX ADMIN — HYDROXYZINE HYDROCHLORIDE 25 MG: 25 TABLET, FILM COATED ORAL at 21:13

## 2020-05-27 RX ADMIN — SUMATRIPTAN SUCCINATE 100 MG: 100 TABLET ORAL at 21:13

## 2020-05-27 RX ADMIN — DULOXETINE HYDROCHLORIDE 60 MG: 60 CAPSULE, DELAYED RELEASE ORAL at 11:17

## 2020-05-27 RX ADMIN — ACETAMINOPHEN, ASPIRIN AND CAFFEINE 1 TABLET: 250; 250; 65 TABLET, FILM COATED ORAL at 21:13

## 2020-05-27 RX ADMIN — DIVALPROEX SODIUM 250 MG: 250 TABLET, FILM COATED, EXTENDED RELEASE ORAL at 12:44

## 2020-05-27 RX ADMIN — ACETAMINOPHEN, ASPIRIN AND CAFFEINE 1 TABLET: 250; 250; 65 TABLET, FILM COATED ORAL at 12:42

## 2020-05-27 RX ADMIN — TOPIRAMATE 100 MG: 100 TABLET, FILM COATED ORAL at 21:13

## 2020-05-27 RX ADMIN — ARIPIPRAZOLE 2 MG: 2 TABLET ORAL at 21:13

## 2020-05-27 RX ADMIN — DIVALPROEX SODIUM 250 MG: 250 TABLET, FILM COATED, EXTENDED RELEASE ORAL at 21:13

## 2020-05-27 RX ADMIN — LEVOTHYROXINE SODIUM 175 MCG: 0.03 TABLET ORAL at 12:42

## 2020-05-27 RX ADMIN — GABAPENTIN 300 MG: 300 CAPSULE ORAL at 21:13

## 2020-05-27 RX ADMIN — GABAPENTIN 300 MG: 300 CAPSULE ORAL at 11:17

## 2020-05-27 RX ADMIN — TRAZODONE HYDROCHLORIDE 50 MG: 50 TABLET ORAL at 21:13

## 2020-05-27 RX ADMIN — TIZANIDINE 2 MG: 4 TABLET ORAL at 11:15

## 2020-05-27 RX ADMIN — FOLIC ACID 1 MG: 1 TABLET ORAL at 11:15

## 2020-05-27 ASSESSMENT — PAIN SCALES - GENERAL
PAINLEVEL_OUTOF10: 6
PAINLEVEL_OUTOF10: 0
PAINLEVEL_OUTOF10: 6
PAINLEVEL_OUTOF10: 0
PAINLEVEL_OUTOF10: 5

## 2020-05-27 ASSESSMENT — SLEEP AND FATIGUE QUESTIONNAIRES
DIFFICULTY STAYING ASLEEP: YES
DIFFICULTY FALLING ASLEEP: YES
DO YOU HAVE DIFFICULTY SLEEPING: YES
RESTFUL SLEEP: NO
DIFFICULTY FALLING ASLEEP: YES
SLEEP PATTERN: DIFFICULTY FALLING ASLEEP
DIFFICULTY STAYING ASLEEP: YES
DO YOU USE A SLEEP AID: NO
DIFFICULTY ARISING: NO
DIFFICULTY ARISING: NO
SLEEP PATTERN: DIFFICULTY FALLING ASLEEP;DISTURBED/INTERRUPTED SLEEP;INSOMNIA;RESTLESSNESS
DO YOU USE A SLEEP AID: NO
AVERAGE NUMBER OF SLEEP HOURS: 4
DO YOU HAVE DIFFICULTY SLEEPING: YES
RESTFUL SLEEP: NO
AVERAGE NUMBER OF SLEEP HOURS: 4

## 2020-05-27 ASSESSMENT — LIFESTYLE VARIABLES
HISTORY_ALCOHOL_USE: NO
HISTORY_ALCOHOL_USE: NO

## 2020-05-27 NOTE — ED PROVIDER NOTES
TOPIRAMATE (TOPAMAX) 100 MG TABLET    Take 1 tablet by mouth nightly    TOPIRAMATE (TOPAMAX) 50 MG TABLET    Take 1 tablet by mouth daily     ALLERGIES     has No Known Allergies. FAMILY HISTORY     has no family status information on file. SOCIAL HISTORY       Social History     Tobacco Use    Smoking status: Current Every Day Smoker     Packs/day: 0.50     Years: 3.00     Pack years: 1.50     Types: Cigarettes    Smokeless tobacco: Never Used   Substance Use Topics    Alcohol use: Not Currently    Drug use: Yes     Types: Marijuana     PHYSICAL EXAM     INITIAL VITALS: /69   Pulse 84   Temp 98.3 °F (36.8 °C) (Oral)   Resp 18   Ht 5' 7\" (1.702 m)   Wt 100 lb (45.4 kg)   SpO2 100%   BMI 15.66 kg/m²    Physical Exam    MEDICAL DECISION MAKING:            Labs Reviewed - No data to display  EMERGENCY DEPARTMENTCOURSE:         Vitals:    Vitals:    05/26/20 2002   BP: 126/69   Pulse: 84   Resp: 18   Temp: 98.3 °F (36.8 °C)   TempSrc: Oral   SpO2: 100%   Weight: 100 lb (45.4 kg)   Height: 5' 7\" (1.702 m)       The patient was given the following medications while in the emergency department:  No orders of the defined types were placed in this encounter. CONSULTS:  None    FINAL IMPRESSION      1. Depression, unspecified depression type          DISPOSITION/PLAN   DISPOSITION Decision To Admit 05/26/2020 08:33:09 PM      PATIENT REFERRED TO:  No follow-up provider specified.   DISCHARGE MEDICATIONS:  New Prescriptions    No medications on file     Danna Barger MD  Attending Emergency Physician                   Radha Altman MD  05/26/20 2033

## 2020-05-27 NOTE — PROGRESS NOTES
Nutrition Assessment    Type and Reason for Visit: Positive Nutrition Screen(Unplanned weight los and decreased appetite)    Nutrition Recommendations: Continue General diet. Start Ensure Enlive 2x/day. Nutrition Assessment: Patient admission is related to depression. Patient p.o intakes have been poor for some time related to life stressors and according to weight records patient has lost 10 lbs in the past 3 months. Patient appears to be severely malnourished. Will start Ensure Enlive 2x/day. Continue General diet. Monitor p.o intakes and Ensure Enlive acceptance. Malnutrition Assessment:  · Malnutrition Status: Meets the criteria for severe malnutrition  · Context: Acute illness or injury  · Findings of the 6 clinical characteristics of malnutrition (Minimum of 2 out of 6 clinical characteristics is required to make the diagnosis of moderate or severe Protein Calorie Malnutrition based on AND/ASPEN Guidelines):  1. Energy Intake-Less than or equal to 50% of estimated energy requirement, Greater than or equal to 3 months    2. Weight Loss-7.5% loss or greater, in 3 months  3. Fat Loss-Unable to assess,    4. Muscle Loss-Unable to assess,    5. Fluid Accumulation-No significant fluid accumulation, Extremities  6.   Strength-     Nutrition Risk Level: High    Nutrient Needs:  · Estimated Daily Total Kcal: 0969-3729 kcal based on 32-35 kcal/kg of admission weight    · Estimated Daily Protein (g): 59-64 gm of protein based on 1.3-1.4 gm/kg of admission weight     Nutrition Diagnosis:   · Problem: Inadequate oral intake  · Etiology: related to Psychological cause/life stress     Signs and symptoms:  as evidenced by Diet history of poor intake, Weight loss, BMI(15.7)    Objective Information:  · Nutrition-Focused Physical Findings: No edema  · Current Nutrition Therapies:  · Oral Diet Orders: General   · Oral Nutrition Supplement (ONS) Orders: None  · Anthropometric Measures:  · Ht: 5' 7\" (170.2 cm)

## 2020-05-27 NOTE — GROUP NOTE
Group Therapy Note    Date: 5/27/2020    Group Start Time: 0900  Group End Time: 0920  Group Topic: [facility] [address], [address]        Group Therapy Note    Attendees: 6    Pt did not attend [employer] d/t resting in room despite staff invitation to attend. 1:1 talk time offered as alternative to group session, pt declined.

## 2020-05-27 NOTE — GROUP NOTE
Group Therapy Note    Date: 5/27/2020    Group Start Time: 1110  Group End Time: 1140  Group Topic: Psychoeducation    [facility]    Patient refused to attend decision making group at 1100 after encouragement from staff. 1:1 talk time offered by staff as alternative to group session.

## 2020-05-27 NOTE — GROUP NOTE
Group Therapy Note    Date: 5/27/2020    Group Start Time: 1330  Group End Time: 9488  Group Topic: [address] [facility], MSW, CLINTW        Group Therapy Note    Attendees: 8/13    patient declined to attend recovery group at 1330 after encouragement from staff.              Signature:  Elsi Larsen MSW, LSW

## 2020-05-27 NOTE — VIRTUAL HEALTH
than inpatient care. Past Psychiatric History  Patient reports that she sees a therapist at [facility] reports current outpatient psychiatric linkage. She sees Dr. Butt in [facility] for medication. Denied history of psychiatric inpatient hospitalizations. Denied history of suicide attempts. History of Substance Abuse     Estefani smokes 1/2 to 3/4 packs of cigarettes daily, smokes \"medicinal\" marijuana for her pain but she does not have an official card. She denied ETOH and street drugs. Family History of psychiatric disorders    Family history: positive for Mother - depression, anxiety and Bipolar disorder. Her \"whole family\" has JARAD. Medical History   Allergies:  Patient has no known allergies. Past Medical History:   Diagnosis Date    Cancer [facility])     Cerebral artery occlusion with cerebral infarction ([facility] [address]     Hyperlipidemia     Psychiatric problem     Thyroid disease       History reviewed. No pertinent surgical history. Neurologic Exam      Mental Status   Oriented to person, place, and time. Oriented to city, area, street and number. Oriented to country. Registration: recalls 3 of 3 objects. Recall at 5 minutes: recalls 3 of 3 objects. Follows 3 step commands. Attention: normal. Concentration: normal.   Speech: speech is normal   Level of consciousness: alert  Knowledge: good. Able to perform simple calculations. Able to name object. Able to read. Able to repeat. Able to write.  Normal comprehension.      Cranial Nerves   Cranial nerves II through XII intact.      Motor Exam   Muscle bulk: normal  Overall muscle tone: normal     Strength   Strength 5/5 throughout.      Sensory Exam   Light touch normal.      Gait, Coordination, and Reflexes      Normal     Coordination   Romberg: negative     Tremor   Resting tremor: absent  Intention tremor: absent  Action tremor: absent     Reflexes   Right brachioradialis: 2+  Left brachioradialis: 2+  Right biceps: 2+  Left biceps: 2+  Right triceps: 2+  Left triceps: 2+  Right patellar: 2+  Left patellar: 2+  Right achilles: 2+  Left achilles: 2+  Right : 2+  Left : 2+    SOCIAL HISTORY: Patient recently ended a six-year relationship. Social History     Socioeconomic History    Marital status: Single     Spouse name: Not on file    Number of children: Not on file    Years of education: Not on file    Highest education level: Not on file   Occupational History    Not on file   Social Needs    Financial resource strain: Not on file    Food insecurity     Worry: Not on file     Inability: Not on file    Transportation needs     Medical: Not on file     Non-medical: Not on file   Tobacco Use    Smoking status: Current Every Day Smoker     Packs/day: 0.50     Years: 3.00     Pack years: 1.50     Types: Cigarettes    Smokeless tobacco: Never Used   Substance and Sexual Activity    Alcohol use: Not Currently    Drug use: Not Currently    Sexual activity: Not on file   Lifestyle    Physical activity     Days per week: Not on file     Minutes per session: Not on file    Stress: Not on file   Relationships    Social connections     Talks on phone: Not on file     Gets together: Not on file     Attends Scientology service: Not on file     Active member of club or organization: Not on file     Attends meetings of clubs or organizations: Not on file     Relationship status: Not on file    Intimate partner violence     Fear of current or ex partner: Not on file     Emotionally abused: Not on file     Physically abused: Not on file     Forced sexual activity: Not on file   Other Topics Concern    Not on file   Social History Narrative    Not on file         Mental Status  Pt. was alert, fully oriented, and cooperative. Appearance and hygiene weredisheveled, poor hygiene . Mood was euphoric. Affect was labile, inappropriately animated Thought process was disorganized. Patient WALTER. Patient denied suicidal ideations. Patient WALTER homicidal ideations . Patient's gross cognitive functions were impaired. Insight and judgement were poor. Both recent and remote memory were impaired. Psychomotor status was agitated. Diagnostic Impression    Bipolar I Disorder, Mixed Episode      Medications  See MAR    traZODone    Treatment Plan:    · Continue inpatient psychiatric treatment. · Continue home medications. · New Order - Depakote ER 250mg BID for mood stability beginning 5/27/2020. · Depakote level ordered for 5/31/2020  · New Order Abilify 2mg @ HS for racing thoughts beginning 5/27/2020. Supportive therapy with medication management. Reviewed risks and benefits as well as potential side effects with patient. Review medications for efficacy and side effects. Therapeutic support and empathetic care provided greater than 20 minutes. Engage in therapeutic activities and groups. Follow up at [facility] [place] after symptoms stabilized. Estimated length of stay: 5-7 days    AILYN Tan - MOHAMUD  Psychiatric Advanced Practice Nurse    Patient Location:  [address] [place] [address]    Provider Location ([facility]/State): [place]    This virtual visit was conducted via interactive/real-time audio/video.
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
## 2020-05-28 LAB
ABSOLUTE EOS #: 0.17 K/UL (ref 0–0.4)
ABSOLUTE IMMATURE GRANULOCYTE: ABNORMAL K/UL (ref 0–0.3)
ABSOLUTE LYMPH #: 2.63 K/UL (ref 1–4.8)
ABSOLUTE MONO #: 0.28 K/UL (ref 0.1–1.3)
ALBUMIN SERPL-MCNC: 3.6 G/DL (ref 3.5–5.2)
ALBUMIN/GLOBULIN RATIO: ABNORMAL (ref 1–2.5)
ALP BLD-CCNC: 59 U/L (ref 35–104)
ALT SERPL-CCNC: 13 U/L (ref 5–33)
ANION GAP SERPL CALCULATED.3IONS-SCNC: 10 MMOL/L (ref 9–17)
AST SERPL-CCNC: 13 U/L
BASOPHILS # BLD: 0 % (ref 0–2)
BASOPHILS ABSOLUTE: 0 K/UL (ref 0–0.2)
BILIRUB SERPL-MCNC: 0.21 MG/DL (ref 0.3–1.2)
BUN BLDV-MCNC: 17 MG/DL (ref 6–20)
BUN/CREAT BLD: ABNORMAL (ref 9–20)
CALCIUM SERPL-MCNC: 8.5 MG/DL (ref 8.6–10.4)
CHLORIDE BLD-SCNC: 110 MMOL/L (ref 98–107)
CHOLESTEROL/HDL RATIO: 2.4
CHOLESTEROL: 133 MG/DL
CO2: 23 MMOL/L (ref 20–31)
CREAT SERPL-MCNC: 0.49 MG/DL (ref 0.5–0.9)
DIFFERENTIAL TYPE: ABNORMAL
EOSINOPHILS RELATIVE PERCENT: 3 % (ref 0–4)
ESTIMATED AVERAGE GLUCOSE: 117 MG/DL
GFR AFRICAN AMERICAN: >60 ML/MIN
GFR NON-AFRICAN AMERICAN: >60 ML/MIN
GFR SERPL CREATININE-BSD FRML MDRD: ABNORMAL ML/MIN/{1.73_M2}
GFR SERPL CREATININE-BSD FRML MDRD: ABNORMAL ML/MIN/{1.73_M2}
GLUCOSE BLD-MCNC: 98 MG/DL (ref 70–99)
HBA1C MFR BLD: 5.7 % (ref 4–6)
HCT VFR BLD CALC: 37.9 % (ref 36–46)
HDLC SERPL-MCNC: 56 MG/DL
HEMOGLOBIN: 12.5 G/DL (ref 12–16)
IMMATURE GRANULOCYTES: ABNORMAL %
LDL CHOLESTEROL: 52 MG/DL (ref 0–130)
LYMPHOCYTES # BLD: 48 % (ref 24–44)
MCH RBC QN AUTO: 31.4 PG (ref 26–34)
MCHC RBC AUTO-ENTMCNC: 32.9 G/DL (ref 31–37)
MCV RBC AUTO: 95.6 FL (ref 80–100)
MONOCYTES # BLD: 5 % (ref 1–7)
MORPHOLOGY: NORMAL
NRBC AUTOMATED: ABNORMAL PER 100 WBC
PDW BLD-RTO: 13.9 % (ref 11.5–14.9)
PLATELET # BLD: 212 K/UL (ref 150–450)
PLATELET ESTIMATE: ABNORMAL
PMV BLD AUTO: 8.7 FL (ref 6–12)
POTASSIUM SERPL-SCNC: 3.7 MMOL/L (ref 3.7–5.3)
RBC # BLD: 3.97 M/UL (ref 4–5.2)
RBC # BLD: ABNORMAL 10*6/UL
SEG NEUTROPHILS: 44 % (ref 36–66)
SEGMENTED NEUTROPHILS ABSOLUTE COUNT: 2.42 K/UL (ref 1.3–9.1)
SODIUM BLD-SCNC: 143 MMOL/L (ref 135–144)
THYROXINE, FREE: 0.87 NG/DL (ref 0.93–1.7)
TOTAL PROTEIN: 5.8 G/DL (ref 6.4–8.3)
TRIGL SERPL-MCNC: 123 MG/DL
TSH SERPL DL<=0.05 MIU/L-ACNC: 1.16 MIU/L (ref 0.3–5)
VLDLC SERPL CALC-MCNC: NORMAL MG/DL (ref 1–30)
WBC # BLD: 5.5 K/UL (ref 3.5–11)
WBC # BLD: ABNORMAL 10*3/UL

## 2020-05-28 PROCEDURE — 1240000000 HC EMOTIONAL WELLNESS R&B

## 2020-05-28 PROCEDURE — 36415 COLL VENOUS BLD VENIPUNCTURE: CPT

## 2020-05-28 PROCEDURE — 84443 ASSAY THYROID STIM HORMONE: CPT

## 2020-05-28 PROCEDURE — 84439 ASSAY OF FREE THYROXINE: CPT

## 2020-05-28 PROCEDURE — 85025 COMPLETE CBC W/AUTO DIFF WBC: CPT

## 2020-05-28 PROCEDURE — 83036 HEMOGLOBIN GLYCOSYLATED A1C: CPT

## 2020-05-28 PROCEDURE — 80061 LIPID PANEL: CPT

## 2020-05-28 PROCEDURE — 80053 COMPREHEN METABOLIC PANEL: CPT

## 2020-05-28 PROCEDURE — 99232 SBSQ HOSP IP/OBS MODERATE 35: CPT | Performed by: NURSE PRACTITIONER

## 2020-05-28 PROCEDURE — 90833 PSYTX W PT W E/M 30 MIN: CPT | Performed by: NURSE PRACTITIONER

## 2020-05-28 PROCEDURE — 6370000000 HC RX 637 (ALT 250 FOR IP): Performed by: PSYCHIATRY & NEUROLOGY

## 2020-05-28 PROCEDURE — 6370000000 HC RX 637 (ALT 250 FOR IP): Performed by: NURSE PRACTITIONER

## 2020-05-28 RX ADMIN — GABAPENTIN 300 MG: 300 CAPSULE ORAL at 12:30

## 2020-05-28 RX ADMIN — DULOXETINE HYDROCHLORIDE 60 MG: 60 CAPSULE, DELAYED RELEASE ORAL at 12:30

## 2020-05-28 RX ADMIN — TRAZODONE HYDROCHLORIDE 50 MG: 50 TABLET ORAL at 20:46

## 2020-05-28 RX ADMIN — ACETAMINOPHEN, ASPIRIN AND CAFFEINE 1 TABLET: 250; 250; 65 TABLET, FILM COATED ORAL at 15:45

## 2020-05-28 RX ADMIN — DIVALPROEX SODIUM 250 MG: 250 TABLET, FILM COATED, EXTENDED RELEASE ORAL at 12:30

## 2020-05-28 RX ADMIN — HYDROXYZINE HYDROCHLORIDE 25 MG: 25 TABLET, FILM COATED ORAL at 20:46

## 2020-05-28 RX ADMIN — GABAPENTIN 300 MG: 300 CAPSULE ORAL at 20:46

## 2020-05-28 RX ADMIN — DIVALPROEX SODIUM 250 MG: 250 TABLET, FILM COATED, EXTENDED RELEASE ORAL at 20:46

## 2020-05-28 RX ADMIN — ALPRAZOLAM 0.5 MG: 0.5 TABLET ORAL at 15:45

## 2020-05-28 RX ADMIN — FOLIC ACID 1 MG: 1 TABLET ORAL at 12:30

## 2020-05-28 RX ADMIN — ARIPIPRAZOLE 2 MG: 2 TABLET ORAL at 20:46

## 2020-05-28 RX ADMIN — ACETAMINOPHEN, ASPIRIN AND CAFFEINE 1 TABLET: 250; 250; 65 TABLET, FILM COATED ORAL at 20:46

## 2020-05-28 RX ADMIN — TOPIRAMATE 50 MG: 50 TABLET, FILM COATED ORAL at 12:29

## 2020-05-28 RX ADMIN — LEVOTHYROXINE SODIUM 175 MCG: 0.03 TABLET ORAL at 12:30

## 2020-05-28 RX ADMIN — TOPIRAMATE 100 MG: 100 TABLET, FILM COATED ORAL at 20:46

## 2020-05-28 ASSESSMENT — PAIN DESCRIPTION - LOCATION
LOCATION: HEAD
LOCATION: HEAD

## 2020-05-28 ASSESSMENT — PAIN SCALES - GENERAL
PAINLEVEL_OUTOF10: 4
PAINLEVEL_OUTOF10: 3
PAINLEVEL_OUTOF10: 6
PAINLEVEL_OUTOF10: 4

## 2020-05-28 ASSESSMENT — PAIN DESCRIPTION - PAIN TYPE
TYPE: ACUTE PAIN
TYPE: ACUTE PAIN

## 2020-05-28 NOTE — PLAN OF CARE
[address] [place] [address]  Day 3 Interdisciplinary Treatment Plan NOTE    Review Date & Time: 5/28/2020 1300    Admission Type:   Admission Type: Voluntary    Reason for admission:  Reason for Admission: increased feelings of anxiety and feeling overwhelmed patient lead patient to have fleeting suicidal ideations with no plan, pt afraid if she didn't get help, she would try to harm self  Estimated Length of Stay: 5-7 days  Estimated Discharge Date Update: to be determined by physician    PATIENT STRENGTHS:  Patient Strengths Strengths: Connection to output provider, Communication, Medication Compliance  Patient Strengths and Limitations:Limitations: Tendency to isolate self  Addictive Behavior:Addictive Behavior  In the past 3 months, have you felt or has someone told you that you have a problem with:  : None  Do you have a history of Chemical Use?: No  Do you have a history of Alcohol Use?: No  Do you have a history of Street Drug Abuse?: No  Histroy of Prescripton Drug Abuse?: No  Medical Problems:  Past Medical History:   Diagnosis Date    [facility]     Cerebral artery occlusion with cerebral infarction ([facility]     Hyperlipidemia     Psychiatric problem     Thyroid disease        Risk:  Fall RiskTotal: 83  Dino Scale Dino Scale Score: 21  BVC Total: 0  Change in scores no Changes to plan of Care no    Status EXAM:   Status and Exam  Normal: No  Facial Expression: Flat, Avoids Gaze  Affect: Appropriate  Level of Consciousness: Alert  Mood:Normal: No  Mood: Anxious  Motor Activity:Normal: No  Motor Activity: Decreased  Interview Behavior: Cooperative  Preception: [place] to Person, [place] to Time, [place] to Place, [place] to Situation  Attention:Normal: No  Attention: Distractible  Thought Processes: Circumstantial  Thought Content:Normal: No  Thought Content: Poverty of Content  Hallucinations: None  Delusions: No  Memory:Normal: Yes  Insight and Judgment: No  Insight and Judgment: Poor Insight  Present

## 2020-05-28 NOTE — GROUP NOTE
Group Therapy Note    Date: 5/28/2020    Group Start Time: 0740  Group End Time: 0915  Group Topic: [address] [place], [place]        Group Therapy Note    Attendees: 8/18         Pt did not attend [employer] d/t resting in room despite staff invitation to attend. 1:1 talk time offered as alternative to group session, pt declined.

## 2020-05-28 NOTE — GROUP NOTE
Group Therapy Note    Date: 5/28/2020    Group Start Time: 1400  Group End Time: 9628  Group Topic: Cognitive Skills    [facility]    [facility] [place], [address]        Group Therapy Note    Attendees: 7/15         Pt did not attend RT skills group d/t resting in room despite staff invitation to attend. 1:1 talk time offered as alternative to group session, pt declined.

## 2020-05-28 NOTE — PLAN OF CARE
Problem: Altered Mood, Depressive Behavior:  Goal: Ability to disclose and discuss suicidal ideas will improve  Description: Ability to disclose and discuss suicidal ideas will improve  5/28/2020 1223 by Dania Roblero LPN  Outcome: Ongoing   Patient denies suicidal ideations at this time  Problem: Altered Mood, Manic Behavior:  Goal: Able to sleep  Description: Able to sleep  Outcome: Ongoing   Patient was able to sleep this shift.     Problem: Altered Mood, Manic Behavior:  Goal: Able to verbalize decrease in frequency and intensity of racing thoughts  Description: Able to verbalize decrease in frequency and intensity of racing thoughts  Outcome: Ongoing   Patient verbalizes a decrease in racing thoughts

## 2020-05-28 NOTE — VIRTUAL HEALTH
Affect:  increased in intensity and redirectable      Appearance:  disheveled and thin & gaunt looking   Activity:  Restless & fidgety   Gait/Posture: Normal   Speech:  Pressured and hyper-verbal   Thought Process:  flight of ideas and tangential   Thought Content:  Homicidal towards \"Trump\"   Sensorium:  person, place, time/date and situation   Cognition:  grossly intact   Memory: impaired recent memory and remote memory   Insight:  limited   Judgment: limited   Suicidal Ideations: denies suicidal ideation   Homicidal Ideations: Positive for homicidal ideation towards \"Trump\"   Medication Side Effects: absent       Attention Span attention span appeared shorter than expected for age       Labs  Recent Results (from the past 67 hour(s))   COVID-19    Collection Time: 05/26/20 11:36 PM   Result Value Ref Range    SARS-CoV-2          SARS-CoV-2, Rapid Not Detected Not Detected    Source . NASOPHARYNGEAL SWAB     SARS-CoV-2, PCR         Comprehensive Metabolic Panel w/ Reflex to MG    Collection Time: 05/28/20  6:00 AM   Result Value Ref Range    Glucose 98 70 - 99 mg/dL    BUN 17 6 - 20 mg/dL    CREATININE 0.49 (L) 0.50 - 0.90 mg/dL    Bun/Cre Ratio NOT REPORTED 9 - 20    Calcium 8.5 (L) 8.6 - 10.4 mg/dL    Sodium 143 135 - 144 mmol/L    Potassium 3.7 3.7 - 5.3 mmol/L    Chloride 110 (H) 98 - 107 mmol/L    CO2 23 20 - 31 mmol/L    Anion Gap 10 9 - 17 mmol/L    Alkaline Phosphatase 59 35 - 104 U/L    ALT 13 5 - 33 U/L    AST 13 <32 U/L    Total Bilirubin 0.21 (L) 0.3 - 1.2 mg/dL    Total Protein 5.8 (L) 6.4 - 8.3 g/dL    Alb 3.6 3.5 - 5.2 g/dL    Albumin/Globulin Ratio NOT REPORTED 1.0 - 2.5    GFR Non-African American >60 >60 mL/min    GFR African American >60 >60 mL/min    GFR Comment          GFR Staging NOT REPORTED    Hemoglobin A1c    Collection Time: 05/28/20  6:00 AM   Result Value Ref Range    Hemoglobin A1C 5.7 4.0 - 6.0 %    Estimated Avg Glucose 117 mg/dL   TSH without Reflex    Collection Time: 05/28/20 6:00 AM   Result Value Ref Range    TSH 1.16 0.30 - 5.00 mIU/L   T4, free    Collection Time: 05/28/20  6:00 AM   Result Value Ref Range    Thyroxine, Free 0.87 (L) 0.93 - 1.70 ng/dL   Lipid panel - fasting    Collection Time: 05/28/20  6:00 AM   Result Value Ref Range    Cholesterol 133 <200 mg/dL    HDL 56 >40 mg/dL    LDL Cholesterol 52 0 - 130 mg/dL    Chol/HDL Ratio 2.4 <5    Triglycerides 123 <150 mg/dL    VLDL NOT REPORTED 1 - 30 mg/dL   CBC auto differential    Collection Time: 05/28/20  6:00 AM   Result Value Ref Range    WBC 5.5 3.5 - 11.0 k/uL    RBC 3.97 (L) 4.0 - 5.2 m/uL    Hemoglobin 12.5 12.0 - 16.0 g/dL    Hematocrit 37.9 36 - 46 %    MCV 95.6 80 - 100 fL    MCH 31.4 26 - 34 pg    MCHC 32.9 31 - 37 g/dL    RDW 13.9 11.5 - 14.9 %    Platelets 005 300 - 892 k/uL    MPV 8.7 6.0 - 12.0 fL    NRBC Automated NOT REPORTED per 100 WBC    Differential Type NOT REPORTED     Immature Granulocytes NOT REPORTED 0 %    Absolute Immature Granulocyte NOT REPORTED 0.00 - 0.30 k/uL    WBC Morphology NOT REPORTED     RBC Morphology NOT REPORTED     Platelet Estimate NOT REPORTED     Seg Neutrophils 44 36 - 66 %    Lymphocytes 48 (H) 24 - 44 %    Monocytes 5 1 - 7 %    Eosinophils % 3 0 - 4 %    Basophils 0 0 - 2 %    Segs Absolute 2.42 1.3 - 9.1 k/uL    Absolute Lymph # 2.63 1.0 - 4.8 k/uL    Absolute Mono # 0.28 0.1 - 1.3 k/uL    Absolute Eos # 0.17 0.0 - 0.4 k/uL    Basophils Absolute 0.00 0.0 - 0.2 k/uL    Morphology Normal        Medications  Current Facility-Administered Medications   Medication Dose Route Frequency Provider Last Rate Last Dose    traZODone (DESYREL) tablet 50 mg  50 mg Oral Nightly PRN Alec HERRING MD   50 mg at 05/27/20 2113    ALPRAZolam (XANAX) tablet 0.5 mg  0.5 mg Oral BID PRN Perfecto Profit, APRN - CNP        gabapentin (NEURONTIN) capsule 300 mg  300 mg Oral BID Jett Aguirre, APRN - CNP   300 mg at 05/28/20 1230    DULoxetine (CYMBALTA) extended release capsule 60 mg AILYN Kong CNP   25 mg at 05/27/20 2113         gabapentin  300 mg Oral BID    DULoxetine  60 mg Oral Daily    levothyroxine  175 mcg Oral Daily    folic acid  1 mg Oral Daily    topiramate  100 mg Oral Nightly    topiramate  50 mg Oral Daily    ARIPiprazole  2 mg Oral Nightly    divalproex  250 mg Oral BID    [START ON 5/29/2020] vitamin D  50,000 Units Oral Weekly    permethrin   Topical Once    nicotine  1 patch Transdermal Daily       ASSESSMENT  Bipolar I Disorder, Mixed Episode    Patient's Response to Treatment:   No Change    PLAN  · Continue inpatient psychiatric treatment. · Continue home medications. · New Order - Depakote ER 250mg BID for mood stability beginning 5/27/2020. · Depakote level ordered for 5/31/2020  · New Order Abilify 2mg @ HS for racing thoughts beginning 5/27/2020. · Supportive therapy with medication management. Reviewed risks and benefits as well as potential side effects with patient. · Review medications for efficacy and side effects. · Therapeutic support and empathetic care provided greater than 20 minutes. · Engage in therapeutic activities and groups. · Follow up at [facility] after symptoms stabilized. Electronically signed by AILYN Kong CNP on 5/28/2020 at 3:23 PM    Patient Location:  [address] [place] [address]    Provider Location (City/State): [place]    This virtual visit was conducted via interactive/real-time audio/video.

## 2020-05-29 PROCEDURE — 6370000000 HC RX 637 (ALT 250 FOR IP): Performed by: NURSE PRACTITIONER

## 2020-05-29 PROCEDURE — 99232 SBSQ HOSP IP/OBS MODERATE 35: CPT | Performed by: NURSE PRACTITIONER

## 2020-05-29 PROCEDURE — 1240000000 HC EMOTIONAL WELLNESS R&B

## 2020-05-29 PROCEDURE — 90833 PSYTX W PT W E/M 30 MIN: CPT | Performed by: NURSE PRACTITIONER

## 2020-05-29 PROCEDURE — 6370000000 HC RX 637 (ALT 250 FOR IP): Performed by: PSYCHIATRY & NEUROLOGY

## 2020-05-29 RX ADMIN — ACETAMINOPHEN, ASPIRIN AND CAFFEINE 1 TABLET: 250; 250; 65 TABLET, FILM COATED ORAL at 18:30

## 2020-05-29 RX ADMIN — DIVALPROEX SODIUM 250 MG: 250 TABLET, FILM COATED, EXTENDED RELEASE ORAL at 08:55

## 2020-05-29 RX ADMIN — LEVOTHYROXINE SODIUM 175 MCG: 0.03 TABLET ORAL at 08:55

## 2020-05-29 RX ADMIN — TIZANIDINE 2 MG: 4 TABLET ORAL at 19:56

## 2020-05-29 RX ADMIN — TOPIRAMATE 100 MG: 100 TABLET, FILM COATED ORAL at 21:19

## 2020-05-29 RX ADMIN — FOLIC ACID 1 MG: 1 TABLET ORAL at 08:55

## 2020-05-29 RX ADMIN — GABAPENTIN 300 MG: 300 CAPSULE ORAL at 08:55

## 2020-05-29 RX ADMIN — TOPIRAMATE 50 MG: 50 TABLET, FILM COATED ORAL at 08:55

## 2020-05-29 RX ADMIN — DIVALPROEX SODIUM 250 MG: 250 TABLET, FILM COATED, EXTENDED RELEASE ORAL at 21:18

## 2020-05-29 RX ADMIN — TRAZODONE HYDROCHLORIDE 50 MG: 50 TABLET ORAL at 21:19

## 2020-05-29 RX ADMIN — DULOXETINE HYDROCHLORIDE 60 MG: 60 CAPSULE, DELAYED RELEASE ORAL at 08:55

## 2020-05-29 RX ADMIN — GABAPENTIN 300 MG: 300 CAPSULE ORAL at 21:19

## 2020-05-29 RX ADMIN — ARIPIPRAZOLE 2 MG: 2 TABLET ORAL at 21:18

## 2020-05-29 RX ADMIN — SUMATRIPTAN SUCCINATE 100 MG: 100 TABLET ORAL at 10:23

## 2020-05-29 RX ADMIN — ALPRAZOLAM 0.5 MG: 0.5 TABLET ORAL at 19:56

## 2020-05-29 ASSESSMENT — PAIN SCALES - GENERAL
PAINLEVEL_OUTOF10: 4
PAINLEVEL_OUTOF10: 7
PAINLEVEL_OUTOF10: 8

## 2020-05-29 NOTE — GROUP NOTE
Group Therapy Note    Date: 5/29/2020    Group Start Time: 0900  Group End Time: 0920  Group Topic: [id number] [facility] [place] [facility], [facility] [address]        Group Therapy Note    Attendees: 5    Pt did not attend [employer] d/t resting in room despite staff invitation to attend. 1:1 talk time offered as alternative to group session, pt declined.

## 2020-05-29 NOTE — VIRTUAL HEALTH
Alb 3.6 3.5 - 5.2 g/dL    Albumin/Globulin Ratio NOT REPORTED 1.0 - 2.5    GFR Non-African American >60 >60 mL/min    GFR African American >60 >60 mL/min    GFR Comment          GFR Staging NOT REPORTED    Hemoglobin A1c    Collection Time: 05/28/20  6:00 AM   Result Value Ref Range    Hemoglobin A1C 5.7 4.0 - 6.0 %    Estimated Avg Glucose 117 mg/dL   TSH without Reflex    Collection Time: 05/28/20  6:00 AM   Result Value Ref Range    TSH 1.16 0.30 - 5.00 mIU/L   T4, free    Collection Time: 05/28/20  6:00 AM   Result Value Ref Range    Thyroxine, Free 0.87 (L) 0.93 - 1.70 ng/dL   Lipid panel - fasting    Collection Time: 05/28/20  6:00 AM   Result Value Ref Range    Cholesterol 133 <200 mg/dL    HDL 56 >40 mg/dL    LDL Cholesterol 52 0 - 130 mg/dL    Chol/HDL Ratio 2.4 <5    Triglycerides 123 <150 mg/dL    VLDL NOT REPORTED 1 - 30 mg/dL   CBC auto differential    Collection Time: 05/28/20  6:00 AM   Result Value Ref Range    WBC 5.5 3.5 - 11.0 k/uL    RBC 3.97 (L) 4.0 - 5.2 m/uL    Hemoglobin 12.5 12.0 - 16.0 g/dL    Hematocrit 37.9 36 - 46 %    MCV 95.6 80 - 100 fL    MCH 31.4 26 - 34 pg    MCHC 32.9 31 - 37 g/dL    RDW 13.9 11.5 - 14.9 %    Platelets 640 388 - 644 k/uL    MPV 8.7 6.0 - 12.0 fL    NRBC Automated NOT REPORTED per 100 WBC    Differential Type NOT REPORTED     Immature Granulocytes NOT REPORTED 0 %    Absolute Immature Granulocyte NOT REPORTED 0.00 - 0.30 k/uL    WBC Morphology NOT REPORTED     RBC Morphology NOT REPORTED     Platelet Estimate NOT REPORTED     Seg Neutrophils 44 36 - 66 %    Lymphocytes 48 (H) 24 - 44 %    Monocytes 5 1 - 7 %    Eosinophils % 3 0 - 4 %    Basophils 0 0 - 2 %    Segs Absolute 2.42 1.3 - 9.1 k/uL    Absolute Lymph # 2.63 1.0 - 4.8 k/uL    Absolute Mono # 0.28 0.1 - 1.3 k/uL    Absolute Eos # 0.17 0.0 - 0.4 k/uL    Basophils Absolute 0.00 0.0 - 0.2 k/uL    Morphology Normal        Medications  Current Facility-Administered Medications   Medication Dose Route Frequency

## 2020-05-30 PROCEDURE — 90833 PSYTX W PT W E/M 30 MIN: CPT | Performed by: NURSE PRACTITIONER

## 2020-05-30 PROCEDURE — 6370000000 HC RX 637 (ALT 250 FOR IP): Performed by: NURSE PRACTITIONER

## 2020-05-30 PROCEDURE — 6370000000 HC RX 637 (ALT 250 FOR IP): Performed by: PSYCHIATRY & NEUROLOGY

## 2020-05-30 PROCEDURE — 1240000000 HC EMOTIONAL WELLNESS R&B

## 2020-05-30 PROCEDURE — 99232 SBSQ HOSP IP/OBS MODERATE 35: CPT | Performed by: NURSE PRACTITIONER

## 2020-05-30 RX ADMIN — ACETAMINOPHEN, ASPIRIN AND CAFFEINE 1 TABLET: 250; 250; 65 TABLET, FILM COATED ORAL at 12:32

## 2020-05-30 RX ADMIN — TIZANIDINE 2 MG: 4 TABLET ORAL at 12:32

## 2020-05-30 RX ADMIN — ACETAMINOPHEN, ASPIRIN AND CAFFEINE 1 TABLET: 250; 250; 65 TABLET, FILM COATED ORAL at 20:57

## 2020-05-30 RX ADMIN — DIVALPROEX SODIUM 250 MG: 250 TABLET, FILM COATED, EXTENDED RELEASE ORAL at 20:57

## 2020-05-30 RX ADMIN — LEVOTHYROXINE SODIUM 175 MCG: 0.03 TABLET ORAL at 08:17

## 2020-05-30 RX ADMIN — GABAPENTIN 300 MG: 300 CAPSULE ORAL at 08:17

## 2020-05-30 RX ADMIN — TOPIRAMATE 100 MG: 100 TABLET, FILM COATED ORAL at 20:57

## 2020-05-30 RX ADMIN — HYDROXYZINE HYDROCHLORIDE 25 MG: 25 TABLET, FILM COATED ORAL at 20:57

## 2020-05-30 RX ADMIN — GABAPENTIN 300 MG: 300 CAPSULE ORAL at 20:57

## 2020-05-30 RX ADMIN — DIVALPROEX SODIUM 250 MG: 250 TABLET, FILM COATED, EXTENDED RELEASE ORAL at 08:17

## 2020-05-30 RX ADMIN — TOPIRAMATE 50 MG: 50 TABLET, FILM COATED ORAL at 08:17

## 2020-05-30 RX ADMIN — FOLIC ACID 1 MG: 1 TABLET ORAL at 08:17

## 2020-05-30 RX ADMIN — DULOXETINE HYDROCHLORIDE 60 MG: 60 CAPSULE, DELAYED RELEASE ORAL at 08:17

## 2020-05-30 RX ADMIN — ARIPIPRAZOLE 2 MG: 2 TABLET ORAL at 20:57

## 2020-05-30 RX ADMIN — SUMATRIPTAN SUCCINATE 100 MG: 100 TABLET ORAL at 04:39

## 2020-05-30 RX ADMIN — TRAZODONE HYDROCHLORIDE 50 MG: 50 TABLET ORAL at 20:57

## 2020-05-30 ASSESSMENT — PAIN SCALES - GENERAL
PAINLEVEL_OUTOF10: 6
PAINLEVEL_OUTOF10: 0
PAINLEVEL_OUTOF10: 0
PAINLEVEL_OUTOF10: 3
PAINLEVEL_OUTOF10: 0
PAINLEVEL_OUTOF10: 0

## 2020-05-30 NOTE — GROUP NOTE
Group Therapy Note    Date: 5/30/2020    Group Start Time: 1000  Group End Time: 6423  Group Topic: Psychoeducation    [facility]    AMRIK Bernstein LSW        Group Therapy Note    Attendees: 7/17    patient refused to attend psychoeducation group at 1000 after encouragement from staff.   1:1 talk time provided as alternative to group session             Signature:  AMRIK Bernstein, CARLOS ENRIQUE

## 2020-05-30 NOTE — VIRTUAL HEALTH
AILYN Carl CNP   60 mg at 05/30/20 7363    levothyroxine (SYNTHROID) tablet 175 mcg  175 mcg Oral Daily AILYN Tovar CNP   175 mcg at 58/24/09 6423    folic acid (FOLVITE) tablet 1 mg  1 mg Oral Daily AILYN Tovar CNP   1 mg at 05/30/20 5784    SUMAtriptan (IMITREX) tablet 100 mg  100 mg Oral Daily PRN AILYN Tovar CNP   100 mg at 05/30/20 2832    tiZANidine (ZANAFLEX) tablet 2 mg  2 mg Oral Daily PRN AILYN Tovar CNP   2 mg at 05/30/20 1232    topiramate (TOPAMAX) tablet 100 mg  100 mg Oral Nightly AILYN Tovar CNP   100 mg at 05/29/20 2119    topiramate (TOPAMAX) tablet 50 mg  50 mg Oral Daily AILYN Tovar CNP   50 mg at 05/30/20 1774    aspirin-acetaminophen-caffeine (EXCEDRIN MIGRAINE) per tablet 1 tablet  1 tablet Oral Q6H PRN AILYN Tovar CNP   1 tablet at 05/30/20 1232    ARIPiprazole (ABILIFY) tablet 2 mg  2 mg Oral Nightly AILYN Tovar CNP   2 mg at 05/29/20 2118    divalproex (DEPAKOTE ER) extended release tablet 250 mg  250 mg Oral BID AILYN Tovar CNP   250 mg at 05/30/20 6276    vitamin D (ERGOCALCIFEROL) capsule 50,000 Units  50,000 Units Oral Weekly AILYN Tovar CNP        permethrin (ELIMITE) 5 % cream   Topical Once AILYN Smiley CNP        benztropine mesylate (COGENTIN) injection 2 mg  2 mg Intramuscular BID PRN AILYN Tovar CNP        magnesium hydroxide (MILK OF MAGNESIA) 400 MG/5ML suspension 30 mL  30 mL Oral Daily PRN AILYN Smiley CNP        aluminum & magnesium hydroxide-simethicone (MAALOX) 200-200-20 MG/5ML suspension 30 mL  30 mL Oral Q6H PRN AILYN Tovar CNP        nicotine (NICODERM CQ) 14 MG/24HR 1 patch  1 patch Transdermal Daily AILYN Tovar CNP        hydrOXYzine (ATARAX) tablet 25 mg  25 mg Oral TID PRN AILYN Tovar CNP   25 mg at 05/28/20

## 2020-05-30 NOTE — GROUP NOTE
Group Therapy Note    Date: 5/30/2020    Group Start Time: 7671  Group End Time: 7496  Group Topic: Psychoeducation    SURI Roland, CTRS    Patient refused to attend social skills group at 80 after encouragement from staff. 1:1 talk time offered by staff as alternative to group session.

## 2020-05-31 VITALS
HEART RATE: 60 BPM | HEIGHT: 67 IN | TEMPERATURE: 98.1 F | SYSTOLIC BLOOD PRESSURE: 103 MMHG | BODY MASS INDEX: 15.7 KG/M2 | WEIGHT: 100 LBS | DIASTOLIC BLOOD PRESSURE: 58 MMHG | RESPIRATION RATE: 14 BRPM | OXYGEN SATURATION: 100 %

## 2020-05-31 LAB
VALPROIC ACID LEVEL: 43 UG/ML (ref 50–125)
VALPROIC DATE LAST DOSE: ABNORMAL
VALPROIC DOSE AMOUNT: 250
VALPROIC TIME LAST DOSE: 2057

## 2020-05-31 PROCEDURE — 80164 ASSAY DIPROPYLACETIC ACD TOT: CPT

## 2020-05-31 PROCEDURE — 6370000000 HC RX 637 (ALT 250 FOR IP): Performed by: NURSE PRACTITIONER

## 2020-05-31 PROCEDURE — 99238 HOSP IP/OBS DSCHRG MGMT 30/<: CPT | Performed by: NURSE PRACTITIONER

## 2020-05-31 PROCEDURE — 36415 COLL VENOUS BLD VENIPUNCTURE: CPT

## 2020-05-31 RX ORDER — SUMATRIPTAN 100 MG/1
100 TABLET, FILM COATED ORAL DAILY PRN
Qty: 30 TABLET | Refills: 0 | Status: SHIPPED | OUTPATIENT
Start: 2020-05-31

## 2020-05-31 RX ORDER — TIZANIDINE 2 MG/1
2 TABLET ORAL DAILY PRN
Qty: 30 TABLET | Refills: 0 | Status: SHIPPED | OUTPATIENT
Start: 2020-05-31

## 2020-05-31 RX ORDER — HYDROXYZINE HYDROCHLORIDE 25 MG/1
25 TABLET, FILM COATED ORAL 3 TIMES DAILY PRN
Qty: 90 TABLET | Refills: 0 | Status: SHIPPED | OUTPATIENT
Start: 2020-05-31 | End: 2020-06-30

## 2020-05-31 RX ORDER — TOPIRAMATE 50 MG/1
50 TABLET, FILM COATED ORAL DAILY
Qty: 30 TABLET | Refills: 0 | Status: SHIPPED | OUTPATIENT
Start: 2020-05-31

## 2020-05-31 RX ORDER — GABAPENTIN 300 MG/1
300 CAPSULE ORAL 2 TIMES DAILY
Qty: 60 CAPSULE | Refills: 0 | Status: SHIPPED | OUTPATIENT
Start: 2020-05-31 | End: 2020-06-30

## 2020-05-31 RX ORDER — ARIPIPRAZOLE 2 MG/1
2 TABLET ORAL NIGHTLY
Qty: 30 TABLET | Refills: 0 | Status: SHIPPED | OUTPATIENT
Start: 2020-05-31

## 2020-05-31 RX ORDER — CHOLECALCIFEROL (VITAMIN D3) 1250 MCG
1 CAPSULE ORAL WEEKLY
Qty: 5 CAPSULE | Refills: 0 | Status: SHIPPED | OUTPATIENT
Start: 2020-05-31

## 2020-05-31 RX ORDER — ACETAMINOPHEN, ASPIRIN AND CAFFEINE 250; 250; 65 MG/1; MG/1; MG/1
1 TABLET, FILM COATED ORAL EVERY 6 HOURS PRN
Qty: 90 TABLET | Refills: 0 | Status: SHIPPED | OUTPATIENT
Start: 2020-05-31

## 2020-05-31 RX ORDER — DULOXETIN HYDROCHLORIDE 60 MG/1
60 CAPSULE, DELAYED RELEASE ORAL DAILY
Qty: 30 CAPSULE | Refills: 0 | Status: SHIPPED | OUTPATIENT
Start: 2020-06-01

## 2020-05-31 RX ORDER — DIVALPROEX SODIUM 250 MG/1
250 TABLET, EXTENDED RELEASE ORAL 2 TIMES DAILY
Qty: 60 TABLET | Refills: 0 | Status: SHIPPED | OUTPATIENT
Start: 2020-05-31

## 2020-05-31 RX ORDER — LEVOTHYROXINE SODIUM 175 UG/1
175 TABLET ORAL DAILY
Qty: 30 TABLET | Refills: 0 | Status: SHIPPED | OUTPATIENT
Start: 2020-05-31

## 2020-05-31 RX ORDER — TRAZODONE HYDROCHLORIDE 50 MG/1
50 TABLET ORAL NIGHTLY PRN
Qty: 30 TABLET | Refills: 0 | Status: SHIPPED | OUTPATIENT
Start: 2020-05-31

## 2020-05-31 RX ORDER — TOPIRAMATE 100 MG/1
100 TABLET, FILM COATED ORAL NIGHTLY
Qty: 30 TABLET | Refills: 0 | Status: SHIPPED | OUTPATIENT
Start: 2020-05-31

## 2020-05-31 RX ORDER — FOLIC ACID 1 MG/1
1 TABLET ORAL DAILY
Qty: 30 TABLET | Refills: 0 | Status: SHIPPED | OUTPATIENT
Start: 2020-06-01

## 2020-05-31 RX ADMIN — GABAPENTIN 300 MG: 300 CAPSULE ORAL at 08:16

## 2020-05-31 RX ADMIN — DULOXETINE HYDROCHLORIDE 60 MG: 60 CAPSULE, DELAYED RELEASE ORAL at 08:16

## 2020-05-31 RX ADMIN — FOLIC ACID 1 MG: 1 TABLET ORAL at 08:16

## 2020-05-31 RX ADMIN — SUMATRIPTAN SUCCINATE 100 MG: 100 TABLET ORAL at 10:16

## 2020-05-31 RX ADMIN — LEVOTHYROXINE SODIUM 175 MCG: 0.03 TABLET ORAL at 08:16

## 2020-05-31 RX ADMIN — ACETAMINOPHEN, ASPIRIN AND CAFFEINE 1 TABLET: 250; 250; 65 TABLET, FILM COATED ORAL at 08:16

## 2020-05-31 RX ADMIN — TOPIRAMATE 50 MG: 50 TABLET, FILM COATED ORAL at 08:16

## 2020-05-31 RX ADMIN — DIVALPROEX SODIUM 250 MG: 250 TABLET, FILM COATED, EXTENDED RELEASE ORAL at 08:16

## 2020-05-31 ASSESSMENT — PAIN SCALES - GENERAL
PAINLEVEL_OUTOF10: 0
PAINLEVEL_OUTOF10: 0
PAINLEVEL_OUTOF10: 3
PAINLEVEL_OUTOF10: 3

## 2020-05-31 NOTE — GROUP NOTE
Group Therapy Note    Date: 5/31/2020    Group Start Time: 9021  Group End Time: 3519  Group Topic: Psychoeducation    SURI Clements, JEFFS    Group Therapy Note    Attendees: 10     Patient's Goal:  Pt will demonstrate improved interpersonal skills    Notes:  Pt attended group and participated    Status After Intervention:  Improved    Participation Level:  Active Listener and Interactive    Participation Quality: Appropriate, Attentive and Sharing      Speech:  normal      Thought Process/Content: Logical  Linear      Affective Functioning: Constricted/Restricted      Mood: euthymic      Level of consciousness:  Alert, Oriented x4 and Attentive      Response to Learning: Able to verbalize current knowledge/experience, Able to verbalize/acknowledge new learning, Able to retain information, Capable of insight, Able to change behavior and Progressing to goal      Endings: None Reported    Modes of Intervention: Education, Support, Socialization, Exploration, Activity and Media      Discipline Responsible: Psychoeducational Specialist      Signature:  Laila Farmer, [address]

## 2020-05-31 NOTE — BH NOTE
LPN charting reviewed.
Patient did not attend 4PM coping skills group.
Patient given tobacco quitline number 47274238902 at this time, refusing to call at this time, states \" I just dont want to quit now\"- patient given information as to the dangers of long term tobacco use. Continue to reinforce the importance of tobacco cessation.
Patient seen by [facility] via telehealth. Medication changes discussed. Will continue to monitor.
Patient was seen by the Nurse Practitioner via telehealth. Migraine medication and reason for admission discussed. NP will continue to monitor patient.
Pt was seen on this day by Niles Guerra NP via tele psych.
days    Metabolic Screening:    No results found for: LABA1C    No results for input(s): CHOL, TRIG, HDL, LDLCALC, LABVLDL in the last 72 hours. Body mass index is 15.66 kg/m². BP Readings from Last 2 Encounters:   05/27/20 121/86   05/24/20 116/86           Pt admitted with followings belongings:  Dentures: None  Vision - Corrective Lenses: Glasses  Jewelry: Ring(3)  Clothing: Undergarments (Comment)(belongings not inventoried due to scabies)  Were All Patient Medications Collected?: Other (comment)(belongings not inventoried due to scabies)  Other Valuables: (belongings not inventoried due to scabies)     PT personal properties are unable to be recorded do to suspected scabies. .me medications were  Patient oriented to surroundings and program expectations and copy of patient rights given. Received admission packet:yes Consents reviewed, signed yes. Patient verbalize understanding: yes.     Patient education on precautions: yes                   Mamadou Lutz RN

## 2020-05-31 NOTE — PLAN OF CARE
Problem: Altered Mood, Depressive Behavior:  Goal: Able to verbalize and/or display a decrease in depressive symptoms  Description: Able to verbalize and/or display a decrease in depressive symptoms  5/31/2020 0758 by Pita Wick RN  Outcome: Ongoing, denies suicidal thoughts, denies thoughts of self harm or harm to others      Problem: Altered Mood, Manic Behavior:  Goal: Able to sleep  Description: Able to sleep  5/31/2020 0758 by Pita Wick RN  Outcome: Ongoing, denies issue      Problem: Nutrition  Goal: Optimal nutrition therapy  Outcome: Ongoing, Patient is selective regarding food      Problem: Tobacco Use:  Goal: Inpatient tobacco use cessation counseling participation  Description: Inpatient tobacco use cessation counseling participation  Outcome: Ongoing, denies tobacco cessations

## 2020-05-31 NOTE — DISCHARGE SUMMARY
indicates findings from admission exam on 5/26/2020 and are not necessarily indicative of current findings): Hospital Course:   Upon admission, Laura Jimenez was provided a safe secure environment, introduced to unit milieu. Patient participated in groups and individual therapies. Meds were adjusted. After few days of hospital care, patient began to feel improvement. Depression lifted, thoughts to harm self ceased. Sleep improved, appetite was good. On morning rounds 5/31/2020, patient endorsed feeling ready for discharge. Patient denies suicidal or homicidal ideations, denies hallucinations or delusions. Denies SE's from meds. It was decided that pt had achieved maximum benefit from hospital care and can be discharged     Consults: None    Significant Diagnostic Studies: Routine labs and diagnostics    Treatments: Psychotropic medications, therapy with group, milieu, and 1:1 with nurses, social workers, PA-C/CNP, and Attending physician.       Discharge Medications:  Discharge Medication List as of 5/31/2020 12:44 PM      START taking these medications    Details   aspirin-acetaminophen-caffeine (EXCEDRIN MIGRAINE) 250-250-65 MG per tablet Take 1 tablet by mouth every 6 hours as needed for Headaches, Disp-90 tablet, R-0Normal      hydrOXYzine (ATARAX) 25 MG tablet Take 1 tablet by mouth 3 times daily as needed for Anxiety (Does not have to be 8 hours apart as long as no more than three doses in a day), Disp-90 tablet, R-0Normal      divalproex (DEPAKOTE ER) 250 MG extended release tablet Take 1 tablet by mouth 2 times daily, Disp-60 tablet, R-0Normal      traZODone (DESYREL) 50 MG tablet Take 1 tablet by mouth nightly as needed for Sleep, Disp-30 tablet, R-0Normal      ARIPiprazole (ABILIFY) 2 MG tablet Take 1 tablet by mouth nightly, Disp-30 tablet, R-0Normal         CONTINUE these medications which have CHANGED    Details   gabapentin (NEURONTIN) 300 MG capsule Take 1 capsule by mouth 2 times daily for 30 days. , Disp-60 capsule, R-0Normal      !! topiramate (TOPAMAX) 100 MG tablet Take 1 tablet by mouth nightly, Disp-30 tablet, R-0Normal      !! topiramate (TOPAMAX) 50 MG tablet Take 1 tablet by mouth daily, Disp-30 tablet, R-0Normal      DULoxetine (CYMBALTA) 60 MG extended release capsule Take 1 capsule by mouth daily, Disp-30 capsule, [id number]      folic acid (FOLVITE) 1 MG tablet Take 1 tablet by mouth daily, Disp-30 tablet, R-0Normal      SUMAtriptan (IMITREX) 100 MG tablet Take 1 tablet by mouth daily as needed for Migraine, Disp-30 tablet, R-0Normal      tiZANidine (ZANAFLEX) 2 MG tablet Take 1 tablet by mouth daily as needed (muscle pain), Disp-30 tablet, R-0Normal      levothyroxine (SYNTHROID) 175 MCG tablet Take 1 tablet by mouth daily, Disp-30 tablet, R-0Normal      Cholecalciferol (VITAMIN D3) 1.25 MG (12776 UT) CAPS Take 1 capsule by mouth once a week, Disp-5 capsule, R-0Normal       !! - Potential duplicate medications found. Please discuss with provider. CONTINUE these medications which have NOT CHANGED    Details   ALPRAZolam (XANAX) 0.5 MG tablet Take 1 tablet by mouth 2 times daily as needed for Sleep or Anxiety for up to 30 days. , Disp-60 tablet, R-0Normal      amphetamine-dextroamphetamine (ADDERALL) 20 MG tablet Take 1 tablet by mouth 2 times daily for 30 days. , Disp-60 tablet, R-0Normal         STOP taking these medications       diphenhydrAMINE (BENADRYL) 25 MG capsule Comments:   Reason for Stopping:         permethrin (ELIMITE) 5 % cream Comments:   Reason for Stopping:              Core Measures statement:   Not applicable    Discharge Exam:  Level of consciousness:  Within normal limits  Appearance: Street clothes, seated, with fair grooming  Behavior/Motor: No abnormalities noted  Attitude toward examiner:  Cooperative, attentive, good eye contact  Speech:  spontaneous, normal rate, normal volume  Mood:  euthymic  Affect:  mood congruent  Thought processes:  linear, goal directed and coherent  Thought content:  Homocidal ideation denies  Suicidal Ideation:  denies suicidal ideation  Delusions:  no evidence of delusions  Perceptual Disturbance:  denies any perceptual disturbance  Cognition:  In tact  Memory: age appropriate  Insight & Judgement: fair  Medication side effects:  denies     Disposition: home    Patient Instructions: Activity: activity as tolerated    Follow-up as scheduled with [facility]    Time Spent: 15 minutes    Engagement: Patient displayed a good level of engagement with the treatments offered during this admission. Discharge planning, findings, and recommendations were discussed with patient and treatment time. Signed:  Andrea Madden   5/31/2020  5:58 PM    Patient Location:  [address] [place] [address]    Provider Location (City/State): [place]    This virtual visit was conducted via interactive/real-time audio/video.

## 2020-06-01 ENCOUNTER — TELEPHONE (OUTPATIENT)
Dept: FAMILY MEDICINE CLINIC | Age: 50
End: 2020-06-01

## 2020-06-01 NOTE — LETTER
[address] [address]  [address] [address]  Dept: [phone]  Dept Fax: [phone]: [phone]      6/4/2020    Dear Jose Manuel Enrique,    I find it necessary to inform you that I must withdraw my professional commitment to you as a Primary Care Physician due to a breakdown in the doctor/patient relationship. It is essential that you continue to receive medical care for your condition. Therefore, I recommend you make immediate arrangements with another physician to provide the needed care. For emergency medical services, please go to the nearest emergency room for treatment. If you wish, I will continue to treat your urgent medical needs which may develop for the following thirty (30) days from today's date. If you have any questions regarding the contents of this letter, you may reach me at my office during normal business hours.     Respectfully,            Tae Ingram, DO

## 2020-06-03 NOTE — TELEPHONE ENCOUNTER
[facility] Initial Follow Up Call    Call within 2 business days of discharge: No     Patient: Vidal Grant Patient : 1970 MRN: [id number]    Depression    RARS: Readmission Risk Score: 13       Spoke with: pt    Discharge department/facility: [facility]    Non-face-to-face services provided:  declined    Follow Up  Future Appointments   Date Time Provider Jacqueline Ray   2020  2:20 Makenzie Mendoza MD Neuro Spec Indy Pineda LPN

## 2020-06-08 RX ORDER — DEXTROAMPHETAMINE SACCHARATE, AMPHETAMINE ASPARTATE, DEXTROAMPHETAMINE SULFATE AND AMPHETAMINE SULFATE 5; 5; 5; 5 MG/1; MG/1; MG/1; MG/1
20 TABLET ORAL 2 TIMES DAILY
Qty: 60 TABLET | Refills: 0 | Status: SHIPPED | OUTPATIENT
Start: 2020-06-08 | End: 2020-07-08

## 2020-06-08 NOTE — TELEPHONE ENCOUNTER
Pt calling stating she spoke with branden last week and forgot to ask if [facility] would fill her adderall, please advise at above number.

## 2020-06-17 ENCOUNTER — TELEPHONE (OUTPATIENT)
Dept: FAMILY MEDICINE CLINIC | Age: 50
End: 2020-06-17

## 2020-06-17 NOTE — TELEPHONE ENCOUNTER
Pt calling stating she went to the ER and diagnosed with what she thought was ringworm but was scabies, given a cream, then ended up being admitted to the hospital in [place] for the scabies, pt used 2 different creams and states now it's back again and is really bad and pt read she might need an oral med, please advise at above number, pt uses [facility] pharmacy.

## 2020-06-25 NOTE — TELEPHONE ENCOUNTER
Pt calling back stating she's used 2 tubes of Premethrin, one given through ER and 1 through hospital, last used x1 week ago, the rash is the same as before, has developed more under her R am, pt can tolerate oral steroids, please advise at above number.

## 2020-06-29 ENCOUNTER — TELEPHONE (OUTPATIENT)
Dept: FAMILY MEDICINE CLINIC | Age: 50
End: 2020-06-29

## 2020-06-29 ENCOUNTER — HOSPITAL ENCOUNTER (EMERGENCY)
Age: 50
Discharge: HOME | End: 2020-06-29
Attending: EMERGENCY MEDICINE
Payer: COMMERCIAL

## 2020-06-29 PROCEDURE — 99282 EMERGENCY DEPT VISIT SF MDM: CPT

## 2020-06-29 RX ORDER — DIPHENHYDRAMINE HCL 25 MG
25 CAPSULE ORAL EVERY 4 HOURS PRN
Qty: 20 CAPSULE | Refills: 0 | Status: SHIPPED | OUTPATIENT
Start: 2020-06-29 | End: 2020-07-09

## 2020-06-29 RX ORDER — PERMETHRIN 50 MG/G
CREAM TOPICAL
Qty: 1 TUBE | Refills: 1 | Status: SHIPPED | OUTPATIENT
Start: 2020-06-29 | End: 2020-06-29 | Stop reason: SDUPTHER

## 2020-06-29 RX ORDER — DIPHENHYDRAMINE HCL 25 MG
25 CAPSULE ORAL EVERY 4 HOURS PRN
Qty: 20 CAPSULE | Refills: 0 | Status: SHIPPED | OUTPATIENT
Start: 2020-06-29 | End: 2020-06-29 | Stop reason: SDUPTHER

## 2020-06-29 RX ORDER — PERMETHRIN 50 MG/G
CREAM TOPICAL
Qty: 1 TUBE | Refills: 1 | Status: SHIPPED | OUTPATIENT
Start: 2020-06-29

## 2020-06-29 RX ORDER — PREDNISONE 20 MG/1
20 TABLET ORAL DAILY
Qty: 5 TABLET | Refills: 0 | Status: SHIPPED | OUTPATIENT
Start: 2020-06-29 | End: 2020-06-29 | Stop reason: SDUPTHER

## 2020-06-29 RX ORDER — PREDNISONE 20 MG/1
20 TABLET ORAL DAILY
Qty: 5 TABLET | Refills: 0 | Status: SHIPPED | OUTPATIENT
Start: 2020-06-29 | End: 2020-07-04

## 2020-06-29 ASSESSMENT — ENCOUNTER SYMPTOMS
SORE THROAT: 0
VOMITING: 0
NAUSEA: 0

## 2020-06-30 NOTE — ED NOTES
RN at bedside to triage pt; RN was able to ask questions but when RN got to questions about drug use, pt became irate and pulled off blood pressure cuff while it was taking and ripped off pulse ox, RN was able to get pulse ox saturation and pulse prior to pt pulling everything off. Pt then began yelling at RN about the physician that was in the room, [facility] had the nerve to ask me if I was a meth head! I have scabbies and an illness, I cannot believe that he asked me that, I need to go to a real hospital, I need to see a real doctor, not this stupid place with stupid people\". RN attempted to explain that it was a logical question and that it was policy to ask about drug use in al pts, pt stated she was leaving, Rn asked pt if she wanted her discharge paperwork or her prescriptions and stated no.       Alpha Kin, APPLE  06/29/20 2011

## 2020-06-30 NOTE — ED PROVIDER NOTES
[address] ED  eMERGENCY dEPARTMENT eNCOUnter      Pt Name: Haley Torres  MRN: [id number]  [place] 1970  Date of evaluation: 6/29/20      CHIEF COMPLAINT       Chief Complaint   Patient presents with    Rash         HISTORY OF PRESENT ILLNESS    Haley Torres is a 44 y.o. female who presents complaining of  rash  The history is provided by the patient. Rash   Location: Scattered rash to mainly to the left arm the hands and fingers. States is been ongoing for a few weeks at least.  She has been treated for scabies in the past and she thinks she has scabies again. Progression:  Worsening  Chronicity:  New  Context: not exposure to similar rash, not insect bite/sting, not medications, not plant contact, not pregnancy and not sick contacts    Relieved by:  Nothing  Worsened by:  Heat  Ineffective treatments:  None tried  Associated symptoms: no fever, no joint pain, no myalgias, no nausea, no sore throat, no URI and not vomiting        REVIEW OF SYSTEMS       Review of Systems   Constitutional: Negative for fever. HENT: Negative for sore throat. Gastrointestinal: Negative for nausea and vomiting. Musculoskeletal: Negative for arthralgias and myalgias. Skin: Positive for rash. All other systems reviewed and are negative. PAST MEDICAL HISTORY     Past Medical History:   Diagnosis Date    Cancer [facility])     Cerebral artery occlusion with cerebral infarction ([facility] [address]     Hyperlipidemia     Psychiatric problem     Thyroid disease        SURGICAL HISTORY     History reviewed. No pertinent surgical history. CURRENT MEDICATIONS       Discharge Medication List as of 6/29/2020  7:59 PM      CONTINUE these medications which have NOT CHANGED    Details   amphetamine-dextroamphetamine (ADDERALL) 20 MG tablet Take 1 tablet by mouth 2 times daily for 30 days. , Disp-60 tablet, R-0Normal      aspirin-acetaminophen-caffeine (EXCEDRIN MIGRAINE) 250-250-65 MG per tablet Take 1 tablet by mouth every 6 hours as needed for Headaches, Disp-90 tablet, R-0Normal      hydrOXYzine (ATARAX) 25 MG tablet Take 1 tablet by mouth 3 times daily as needed for Anxiety (Does not have to be 8 hours apart as long as no more than three doses in a day), Disp-90 tablet, R-0Normal      divalproex (DEPAKOTE ER) 250 MG extended release tablet Take 1 tablet by mouth 2 times daily, Disp-60 tablet, R-0Normal      gabapentin (NEURONTIN) 300 MG capsule Take 1 capsule by mouth 2 times daily for 30 days. , Disp-60 capsule, R-0Normal      !! topiramate (TOPAMAX) 100 MG tablet Take 1 tablet by mouth nightly, Disp-30 tablet, R-0Normal      !! topiramate (TOPAMAX) 50 MG tablet Take 1 tablet by mouth daily, Disp-30 tablet, R-0Normal      DULoxetine (CYMBALTA) 60 MG extended release capsule Take 1 capsule by mouth daily, Disp-30 capsule, R-0Normal      traZODone (DESYREL) 50 MG tablet Take 1 tablet by mouth nightly as needed for Sleep, Disp-30 tablet, R-0Normal      ARIPiprazole (ABILIFY) 2 MG tablet Take 1 tablet by mouth nightly, Disp-30 tablet, [id number]      folic acid (FOLVITE) 1 MG tablet Take 1 tablet by mouth daily, Disp-30 tablet, R-0Normal      SUMAtriptan (IMITREX) 100 MG tablet Take 1 tablet by mouth daily as needed for Migraine, Disp-30 tablet, R-0Normal      tiZANidine (ZANAFLEX) 2 MG tablet Take 1 tablet by mouth daily as needed (muscle pain), Disp-30 tablet, R-0Normal      levothyroxine (SYNTHROID) 175 MCG tablet Take 1 tablet by mouth daily, Disp-30 tablet, R-0Normal      Cholecalciferol (VITAMIN D3) 1.25 MG (34110 UT) CAPS Take 1 capsule by mouth once a week, Disp-5 capsule, R-0Normal       !! - Potential duplicate medications found. Please discuss with provider. ALLERGIES     has No Known Allergies. FAMILY HISTORY     has no family status information on file. SOCIAL HISTORY      reports that she has been smoking cigarettes. She has a 1.50 pack-year smoking history.  She has never used smokeless tobacco. She permethrin (ELIMITE) 5 % cream     Sig: Apply topically as directed     Dispense:  1 Tube     Refill:  1    DISCONTD: predniSONE (DELTASONE) 20 MG tablet     Sig: Take 1 tablet by mouth daily for 5 days     Dispense:  5 tablet     Refill:  0    DISCONTD: diphenhydrAMINE (BENADRYL) 25 MG capsule     Sig: Take 1 capsule by mouth every 4 hours as needed for Itching     Dispense:  20 capsule     Refill:  0    diphenhydrAMINE (BENADRYL) 25 MG capsule     Sig: Take 1 capsule by mouth every 4 hours as needed for Itching     Dispense:  20 capsule     Refill:  0    permethrin (ELIMITE) 5 % cream     Sig: Apply topically as directed     Dispense:  1 Tube     Refill:  1    predniSONE (DELTASONE) 20 MG tablet     Sig: Take 1 tablet by mouth daily for 5 days     Dispense:  5 tablet     Refill:  0       -------------------------      CRITICAL CARE:     CONSULTS:  None    PROCEDURES:  Procedures    FINAL IMPRESSION      1. Rash and other nonspecific skin eruption          DISPOSITION/PLAN   DISPOSITION Decision To Discharge 06/29/2020 07:43:27 PM      PATIENT REFERREDTO:  No follow-up provider specified.     DISCHARGEMEDICATIONS:  Discharge Medication List as of 6/29/2020  7:59 PM      START taking these medications    Details   permethrin (ELIMITE) 5 % cream Apply topically as directed, Disp-1 Tube, R-1, Print      predniSONE (DELTASONE) 20 MG tablet Take 1 tablet by mouth daily for 5 days, Disp-5 tablet, R-0Print      diphenhydrAMINE (BENADRYL) 25 MG capsule Take 1 capsule by mouth every 4 hours as needed for Itching, Disp-20 capsule, R-0Print             (Please note that portions of this note were completed with a voice recognition program.  Efforts were made to edit thedictations but occasionally words are mis-transcribed.)    HENRRY Davis Cha, Cha, PA-C  06/29/20 2110       Susan Arevalo PA-C  06/29/20 2117

## 2020-07-10 RX ORDER — TOPIRAMATE 50 MG/1
50 TABLET, FILM COATED ORAL DAILY
Qty: 30 TABLET | Refills: 0 | OUTPATIENT
Start: 2020-07-10

## 2020-07-10 RX ORDER — DEXTROAMPHETAMINE SACCHARATE, AMPHETAMINE ASPARTATE, DEXTROAMPHETAMINE SULFATE AND AMPHETAMINE SULFATE 5; 5; 5; 5 MG/1; MG/1; MG/1; MG/1
20 TABLET ORAL 2 TIMES DAILY
Qty: 60 TABLET | Refills: 0 | OUTPATIENT
Start: 2020-07-10 | End: 2020-08-09

## 2020-07-10 RX ORDER — SUMATRIPTAN 100 MG/1
100 TABLET, FILM COATED ORAL DAILY PRN
Qty: 30 TABLET | Refills: 0 | OUTPATIENT
Start: 2020-07-10

## 2020-07-10 RX ORDER — ARIPIPRAZOLE 2 MG/1
2 TABLET ORAL NIGHTLY
Qty: 30 TABLET | Refills: 0 | OUTPATIENT
Start: 2020-07-10

## 2020-07-10 RX ORDER — LEVOTHYROXINE SODIUM 175 UG/1
175 TABLET ORAL DAILY
Qty: 30 TABLET | Refills: 0 | OUTPATIENT
Start: 2020-07-10

## 2020-07-10 RX ORDER — TOPIRAMATE 100 MG/1
100 TABLET, FILM COATED ORAL NIGHTLY
Qty: 30 TABLET | Refills: 0 | OUTPATIENT
Start: 2020-07-10

## 2020-07-10 RX ORDER — GABAPENTIN 300 MG/1
300 CAPSULE ORAL 2 TIMES DAILY
Qty: 60 CAPSULE | Refills: 0 | OUTPATIENT
Start: 2020-07-10 | End: 2020-08-09

## 2020-07-10 RX ORDER — DULOXETIN HYDROCHLORIDE 60 MG/1
60 CAPSULE, DELAYED RELEASE ORAL DAILY
Qty: 30 CAPSULE | Refills: 0 | OUTPATIENT
Start: 2020-07-10

## 2020-08-03 ENCOUNTER — TELEPHONE (OUTPATIENT)
Dept: FAMILY MEDICINE CLINIC | Age: 50
End: 2020-08-03

## 2020-08-03 NOTE — TELEPHONE ENCOUNTER
Pt called in to see why Dr Javi Stapleton terminated her from her care. Explained that we have much documentation where pt has accused Dr Javi Stapleton of controlling her life and her medications (see 5-5-20). Pt also used foul language when Dr Javi Stapleton was unable to medically clear her to renew her 's license due to her head injury. We  attempted to explain numerous times that this must come from a neurologist or someone who specializes in TBI's (3-6-20). Pt then said \"well maybe Dr Javi Stapleton should be more educated about pt's with brain injuries and get more experience\".

## 2021-10-02 ENCOUNTER — HOSPITAL ENCOUNTER (EMERGENCY)
Age: 51
Discharge: HOME | End: 2021-10-02
Attending: EMERGENCY MEDICINE
Payer: COMMERCIAL

## 2021-10-02 VITALS
RESPIRATION RATE: 16 BRPM | OXYGEN SATURATION: 99 % | HEART RATE: 71 BPM | TEMPERATURE: 97.2 F | WEIGHT: 110 LBS | SYSTOLIC BLOOD PRESSURE: 120 MMHG | BODY MASS INDEX: 17.27 KG/M2 | HEIGHT: 67 IN | DIASTOLIC BLOOD PRESSURE: 77 MMHG

## 2021-10-02 DIAGNOSIS — G89.29 ACUTE EXACERBATION OF CHRONIC LOW BACK PAIN: Primary | ICD-10-CM

## 2021-10-02 DIAGNOSIS — M54.50 ACUTE EXACERBATION OF CHRONIC LOW BACK PAIN: Primary | ICD-10-CM

## 2021-10-02 LAB
BILIRUBIN URINE: NEGATIVE
COLOR: YELLOW
COMMENT UA: NORMAL
GLUCOSE URINE: NEGATIVE
KETONES, URINE: NEGATIVE
LEUKOCYTE ESTERASE, URINE: NEGATIVE
NITRITE, URINE: NEGATIVE
PH UA: 6 (ref 5–8)
PROTEIN UA: NEGATIVE
SPECIFIC GRAVITY UA: 1.01 (ref 1–1.03)
TURBIDITY: CLEAR
URINE HGB: NEGATIVE
UROBILINOGEN, URINE: NORMAL

## 2021-10-02 PROCEDURE — 96372 THER/PROPH/DIAG INJ SC/IM: CPT

## 2021-10-02 PROCEDURE — 6360000002 HC RX W HCPCS: Performed by: STUDENT IN AN ORGANIZED HEALTH CARE EDUCATION/TRAINING PROGRAM

## 2021-10-02 PROCEDURE — 81003 URINALYSIS AUTO W/O SCOPE: CPT

## 2021-10-02 PROCEDURE — 99282 EMERGENCY DEPT VISIT SF MDM: CPT

## 2021-10-02 RX ORDER — CYCLOBENZAPRINE HCL 10 MG
10 TABLET ORAL 3 TIMES DAILY PRN
Qty: 21 TABLET | Refills: 0 | Status: SHIPPED | OUTPATIENT
Start: 2021-10-02 | End: 2021-10-12

## 2021-10-02 RX ORDER — KETOROLAC TROMETHAMINE 30 MG/ML
30 INJECTION, SOLUTION INTRAMUSCULAR; INTRAVENOUS ONCE
Status: COMPLETED | OUTPATIENT
Start: 2021-10-02 | End: 2021-10-02

## 2021-10-02 RX ORDER — NAPROXEN 500 MG/1
500 TABLET ORAL 2 TIMES DAILY PRN
Qty: 60 TABLET | Refills: 0 | Status: SHIPPED | OUTPATIENT
Start: 2021-10-02

## 2021-10-02 RX ORDER — LIDOCAINE 50 MG/G
1 PATCH TOPICAL DAILY
Qty: 10 PATCH | Refills: 0 | Status: SHIPPED | OUTPATIENT
Start: 2021-10-02 | End: 2021-10-12

## 2021-10-02 RX ADMIN — KETOROLAC TROMETHAMINE 30 MG: 30 INJECTION, SOLUTION INTRAMUSCULAR; INTRAVENOUS at 14:23

## 2021-10-02 ASSESSMENT — PAIN SCALES - GENERAL
PAINLEVEL_OUTOF10: 10
PAINLEVEL_OUTOF10: 10

## 2021-10-02 NOTE — ED NOTES
This patient was assessed by the doctor only. Nurse processed and completed the orders from this doctor ie labs, meds, and/or EKG.         Larisa Gupta RN  10/02/21 1931

## 2021-10-02 NOTE — ED PROVIDER NOTES
[facility] ED     Emergency Department     Faculty Attestation    I performed a history and physical examination of the patient and discussed management with the resident. I reviewed the residents note and agree with the documented findings and plan of care. Any areas of disagreement are noted on the chart. I was personally present for the key portions of any procedures. I have documented in the chart those procedures where I was not present during the key portions. I have reviewed the emergency nurses triage note. I agree with the chief complaint, past medical history, past surgical history, allergies, medications, social and family history as documented unless otherwise noted below. For Physician Assistant/ Nurse Practitioner cases/documentation I have personally evaluated this patient and have completed at least one if not all key elements of the E/M (history, physical exam, and MDM). Additional findings are as noted. This patient was evaluated in the Emergency Department for symptoms described in the history of present illness. He/she was evaluated in the context of the global COVID-19 pandemic, which necessitated consideration that the patient might be at risk for infection with the SARS-CoV-2 virus that causes COVID-19. Institutional protocols and algorithms that pertain to the evaluation of patients at risk for COVID-19 are in a state of rapid change based on information released by regulatory bodies including the CDC and federal and state organizations. These policies and algorithms were followed during the patient's care in the ED. Patient here with left lower back pain for the last 5 days. No injury no trauma she can recall. No hematuria no dysuria no fevers no vomiting. On exam nontoxic well-appearing does have focal reproducible left lower lumbar tenderness. Mild left upper but no left lower tenderness no rebound no guarding no pulsatile mass.   Will check urine, possible CT      Critical Care     none    Alta Barragan MD, Evelia Rojo  Attending Emergency  Physician             Alta Barragan MD  10/02/21 4620

## 2021-10-03 ASSESSMENT — ENCOUNTER SYMPTOMS
VOMITING: 0
NAUSEA: 0
BACK PAIN: 1
WHEEZING: 0
SHORTNESS OF BREATH: 0

## 2021-10-03 NOTE — ED PROVIDER NOTES
[address] [facility]  ED  Emergency Department Encounter  EmergencyMedicine Resident     Pt Name:Laura East  MRN: [id number]  [place] 1970  Date of evaluation: 10/3/21  PCP:  Maxi Perry PA-C    CHIEF COMPLAINT       Chief Complaint   Patient presents with    Back Pain     c/o pain in the back worsening by the day, thinks it may be R/T kidneys although no HX of kidney disease       HISTORY OF PRESENT ILLNESS  (Location/Symptom, Timing/Onset, Context/Setting, Quality, Duration, Modifying Factors, Severity.)      Yane Stark is a 46 y.o. female who presents with left-sided back pain. Patient reports that several days ago she was working on a vehicle when she twisted her back and believes she strained a muscle at that time. States that symptoms briefly improved however over the past day she has had increasing back pain which feels deep, located on left side. Pain does not radiate. Denies any numbness, weakness. She denies any dysuria, hematuria, fever, chills or other complaints at this time    PAST MEDICAL / SURGICAL / SOCIAL / FAMILY HISTORY      has a past medical history of Cancer [facility]), Cerebral artery occlusion with cerebral infarction ([facility] [address] Hyperlipidemia, Psychiatric problem, and Thyroid disease.       Denies any significant past surgical history    Social History     Socioeconomic History    Marital status: Single     Spouse name: Not on file    Number of children: Not on file    Years of education: Not on file    Highest education level: Not on file   Occupational History    Not on file   Tobacco Use    Smoking status: Current Every Day Smoker     Packs/day: 0.50     Years: 3.00     Pack years: 1.50     Types: Cigarettes    Smokeless tobacco: Never Used   Vaping Use    Vaping Use: Never used   Substance and Sexual Activity    Alcohol use: Not Currently    Drug use: Yes     Types: Marijuana    Sexual activity: Not on file   Other Topics Concern    Not on file Social History Narrative    Not on file     Social Determinants of Health     Financial Resource Strain:     Difficulty of Paying Living Expenses:    Food Insecurity:     Worried About Running Out of Food in the Last Year:     [address] in the Last Year:    Transportation Needs:     Lack of Transportation (Medical):  Lack of Transportation (Non-Medical):    Physical Activity:     Days of Exercise per Week:     Minutes of Exercise per Session:    Stress:     Feeling of Stress :    Social Connections:     Frequency of Communication with Friends and Family:     Frequency of Social Gatherings with Friends and Family:     Attends Jew Services:     Active Member of Clubs or Organizations:     Attends Club or Organization Meetings:     Marital Status:    Intimate Partner Violence:     Fear of Current or Ex-Partner:     Emotionally Abused:     Physically Abused:     Sexually Abused:        History reviewed. No pertinent family history. Allergies:  Patient has no known allergies. Home Medications:  Prior to Admission medications    Medication Sig Start Date End Date Taking? Authorizing Provider   lidocaine (LIDODERM) 5 % Place 1 patch onto the skin daily for 10 days 12 hours on, 12 hours off. 10/2/21 10/12/21 Yes Wenceslao Larios DO   cyclobenzaprine (FLEXERIL) 10 MG tablet Take 1 tablet by mouth 3 times daily as needed for Muscle spasms 10/2/21 10/12/21 Yes Wenceslao Larios DO   naproxen (NAPROSYN) 500 MG tablet Take 1 tablet by mouth 2 times daily as needed for Pain 10/2/21  Yes Wenceslao Larios DO   permethrin (ELIMITE) 5 % cream Apply topically as directed 6/29/20   Faviola Leblanc PA-C   amphetamine-dextroamphetamine (ADDERALL) 20 MG tablet Take 1 tablet by mouth 2 times daily for 30 days.  6/8/20 7/8/20  Javan Mehrdad,    aspirin-acetaminophen-caffeine (EXCEDRIN MIGRAINE) 824-241-25 MG per tablet Take 1 tablet by mouth every 6 hours as needed for Headaches 5/31/20   [facility] Joshua Carmona, APRN - CNP   divalproex (DEPAKOTE ER) 250 MG extended release tablet Take 1 tablet by mouth 2 times daily 5/31/20   [facility], APRN - CNP   gabapentin (NEURONTIN) 300 MG capsule Take 1 capsule by mouth 2 times daily for 30 days. 5/31/20 6/30/20  [facility], APRN - CNP   topiramate (TOPAMAX) 100 MG tablet Take 1 tablet by mouth nightly 5/31/20   [facility], APRN - CNP   topiramate (TOPAMAX) 50 MG tablet Take 1 tablet by mouth daily 5/31/20   [facility], [facility] - CNP   DULoxetine (CYMBALTA) 60 MG extended release capsule Take 1 capsule by mouth daily 6/1/20   [facility] - [facility]   traZODone (DESYREL) 50 MG tablet Take 1 tablet by mouth nightly as needed for Sleep 5/31/20   [facility], [facility] - [facility]   ARIPiprazole (ABILIFY) 2 MG tablet Take 1 tablet by mouth nightly 5/31/20   [facility], APRN - CNP   folic acid (FOLVITE) 1 MG tablet Take 1 tablet by mouth daily 6/1/20   [facility], APRN - CNP   SUMAtriptan (IMITREX) 100 MG tablet Take 1 tablet by mouth daily as needed for Migraine 5/31/20   [facility], APR[facility] - CNP   tiZANidine (ZANAFLEX) 2 MG tablet Take 1 tablet by mouth daily as needed (muscle pain) 5/31/20   [facility], APRN - CNP   levothyroxine (SYNTHROID) 175 MCG tablet Take 1 tablet by mouth daily 5/31/20   [facility], APRN - CNP   Cholecalciferol (VITAMIN D3) 1.25 MG (71722 UT) CAPS Take 1 capsule by mouth once a week 5/31/20   [facility], APRN - CNP       REVIEW OF SYSTEMS    (2-9 systems for level 4, 10 or more for level 5)      Review of Systems   Constitutional: Negative for fatigue and fever. Eyes: Negative for visual disturbance. Respiratory: Negative for shortness of breath and wheezing. Cardiovascular: Negative for chest pain. Gastrointestinal: Negative for nausea and vomiting. Genitourinary: Negative for dysuria and hematuria. Musculoskeletal: Positive for back pain.  Negative for neck pain and neck stiffness. Skin: Negative for rash and wound. Neurological: Negative for light-headedness, numbness and headaches. Psychiatric/Behavioral: Negative for confusion. PHYSICAL EXAM   (up to 7 for level 4, 8 or more for level 5)      INITIAL VITALS:   /77   Pulse 71   Temp 97.2 °F (36.2 °C) (Oral)   Resp 16   Ht 5' 7\" (1.702 m)   Wt 110 lb (49.9 kg)   SpO2 99%   BMI 17.23 kg/m²     Physical Exam  Constitutional:       General: She is not in acute distress. Appearance: She is not toxic-appearing. HENT:      Head: Normocephalic and atraumatic. Eyes:      Extraocular Movements: Extraocular movements intact. Pupils: Pupils are equal, round, and reactive to light. Cardiovascular:      Rate and Rhythm: Normal rate. Heart sounds: No murmur heard. No friction rub. No gallop. Pulmonary:      Effort: No respiratory distress. Breath sounds: No wheezing, rhonchi or rales. Abdominal:      General: There is no distension. Tenderness: There is no abdominal tenderness. There is no guarding or rebound. Musculoskeletal:      Right lower leg: No edema. Left lower leg: No edema. Skin:     Findings: No lesion or rash. Neurological:      Mental Status: She is alert. Sensory: No sensory deficit. Motor: No weakness. DIFFERENTIAL  DIAGNOSIS     PLAN (LABS / IMAGING / EKG):  Orders Placed This Encounter   Procedures    Urinalysis Reflex to Culture       MEDICATIONS ORDERED:  Orders Placed This Encounter   Medications    ketorolac (TORADOL) injection 30 mg    lidocaine (LIDODERM) 5 %     Sig: Place 1 patch onto the skin daily for 10 days 12 hours on, 12 hours off.      Dispense:  10 patch     Refill:  0    cyclobenzaprine (FLEXERIL) 10 MG tablet     Sig: Take 1 tablet by mouth 3 times daily as needed for Muscle spasms     Dispense:  21 tablet     Refill:  0    naproxen (NAPROSYN) 500 MG tablet     Sig: Take 1 tablet by mouth 2 times daily as needed for Pain     Dispense:  60 tablet     Refill:  0       DDX: All spasm, muscle strain, pyelonephritis, nephrolithiasis    DIAGNOSTIC RESULTS / EMERGENCY DEPARTMENT COURSE / MDM   LAB RESULTS:  Results for orders placed or performed during the hospital encounter of 10/02/21   Urinalysis Reflex to Culture    Specimen: Urine, clean catch   Result Value Ref Range    Color, UA Yellow Yellow    Turbidity UA Clear Clear    Glucose, Ur NEGATIVE NEGATIVE    Bilirubin Urine NEGATIVE NEGATIVE    Ketones, Urine NEGATIVE NEGATIVE    Specific Gravity, UA 1.011 1.005 - 1.030    Urine Hgb NEGATIVE NEGATIVE    pH, UA 6.0 5.0 - 8.0    Protein, UA NEGATIVE NEGATIVE    Urobilinogen, Urine Normal Normal    Nitrite, Urine NEGATIVE NEGATIVE    Leukocyte Esterase, Urine NEGATIVE NEGATIVE    Urinalysis Comments       Microscopic exam not performed based on chemical results unless requested in original order. IMPRESSION/ ED Course: 57-year-old female no acute distress presenting with left-sided back pain. Likely musculoskeletal in nature, patient does have some left CVA tenderness, no signs or symptoms of infectious process. Urinalysis unremarkable, doubt UTI, pyelonephritis or nephrolithiasis. Treated symptomatically in emergency department and given outpatient prescriptions for symptomatic pain management. Given ED return cautions and follow directions. She verbalized understanding and agreed with discharge plan. CONSULTS:  None    CRITICAL CARE:  Please see attending note    FINAL IMPRESSION      1.  Acute exacerbation of chronic low back pain          DISPOSITION / PLAN     DISPOSITION Decision To Discharge 10/02/2021 03:21:54 PM      PATIENT REFERRED TO:  [address]Y [id number]  [address]    Schedule an appointment as soon as possible for a visit in 1 week  For re-evaluation      DISCHARGE MEDICATIONS:  Discharge Medication List as of 10/2/2021  3:23 PM      START taking these medications    Details   lidocaine (LIDODERM) 5 % Place 1 patch onto the skin daily for 10 days 12 hours on, 12 hours off., Disp-10 patch, R-0Print      cyclobenzaprine (FLEXERIL) 10 MG tablet Take 1 tablet by mouth 3 times daily as needed for Muscle spasms, Disp-21 tablet, R-0Print      naproxen (NAPROSYN) 500 MG tablet Take 1 tablet by mouth 2 times daily as needed for Pain, Disp-60 tablet, R-0Print             Celina Lind DO  Emergency Medicine Resident    (Please note that portions of thisnote were completed with a voice recognition program.  Efforts were made to edit the dictations but occasionally words are mis-transcribed.)        Celina Lind DO  Resident  10/03/21 [id number]

## 2021-10-12 ENCOUNTER — HOSPITAL ENCOUNTER (EMERGENCY)
Age: 51
Discharge: HOME | End: 2021-10-12
Attending: EMERGENCY MEDICINE
Payer: COMMERCIAL

## 2021-10-12 ENCOUNTER — APPOINTMENT (OUTPATIENT)
Dept: CT IMAGING | Age: 51
End: 2021-10-12
Payer: COMMERCIAL

## 2021-10-12 VITALS
WEIGHT: 110 LBS | TEMPERATURE: 97.2 F | OXYGEN SATURATION: 98 % | DIASTOLIC BLOOD PRESSURE: 56 MMHG | HEIGHT: 67 IN | HEART RATE: 57 BPM | SYSTOLIC BLOOD PRESSURE: 111 MMHG | BODY MASS INDEX: 17.27 KG/M2 | RESPIRATION RATE: 20 BRPM

## 2021-10-12 DIAGNOSIS — N20.0 NEPHROLITHIASIS: Primary | ICD-10-CM

## 2021-10-12 LAB
-: NORMAL
ANION GAP SERPL CALCULATED.3IONS-SCNC: 10 MMOL/L (ref 9–17)
BILIRUBIN URINE: NEGATIVE
BUN BLDV-MCNC: 14 MG/DL (ref 6–20)
BUN/CREAT BLD: ABNORMAL (ref 9–20)
CALCIUM SERPL-MCNC: 9.4 MG/DL (ref 8.6–10.4)
CHLORIDE BLD-SCNC: 109 MMOL/L (ref 98–107)
CO2: 24 MMOL/L (ref 20–31)
COLOR: YELLOW
COMMENT UA: NORMAL
CREAT SERPL-MCNC: 0.57 MG/DL (ref 0.5–0.9)
GFR AFRICAN AMERICAN: >60 ML/MIN
GFR NON-AFRICAN AMERICAN: >60 ML/MIN
GFR SERPL CREATININE-BSD FRML MDRD: ABNORMAL ML/MIN/{1.73_M2}
GFR SERPL CREATININE-BSD FRML MDRD: ABNORMAL ML/MIN/{1.73_M2}
GLUCOSE BLD-MCNC: 98 MG/DL (ref 70–99)
GLUCOSE URINE: NEGATIVE
KETONES, URINE: NEGATIVE
LEUKOCYTE ESTERASE, URINE: NEGATIVE
NITRITE, URINE: NEGATIVE
PH UA: 7 (ref 5–8)
POTASSIUM SERPL-SCNC: 4.3 MMOL/L (ref 3.7–5.3)
PROTEIN UA: NEGATIVE
REASON FOR REJECTION: NORMAL
SODIUM BLD-SCNC: 143 MMOL/L (ref 135–144)
SPECIFIC GRAVITY UA: 1.01 (ref 1–1.03)
TURBIDITY: CLEAR
URINE HGB: NEGATIVE
UROBILINOGEN, URINE: NORMAL
ZZ NTE CLEAN UP: ORDERED TEST: NORMAL
ZZ NTE WITH NAME CLEAN UP: SPECIMEN SOURCE: NORMAL

## 2021-10-12 PROCEDURE — 80048 BASIC METABOLIC PNL TOTAL CA: CPT

## 2021-10-12 PROCEDURE — 74176 CT ABD & PELVIS W/O CONTRAST: CPT

## 2021-10-12 PROCEDURE — 96374 THER/PROPH/DIAG INJ IV PUSH: CPT

## 2021-10-12 PROCEDURE — 87086 URINE CULTURE/COLONY COUNT: CPT

## 2021-10-12 PROCEDURE — 81003 URINALYSIS AUTO W/O SCOPE: CPT

## 2021-10-12 PROCEDURE — 6360000002 HC RX W HCPCS: Performed by: STUDENT IN AN ORGANIZED HEALTH CARE EDUCATION/TRAINING PROGRAM

## 2021-10-12 PROCEDURE — 99282 EMERGENCY DEPT VISIT SF MDM: CPT

## 2021-10-12 RX ORDER — KETOROLAC TROMETHAMINE 30 MG/ML
30 INJECTION, SOLUTION INTRAMUSCULAR; INTRAVENOUS ONCE
Status: COMPLETED | OUTPATIENT
Start: 2021-10-12 | End: 2021-10-12

## 2021-10-12 RX ORDER — HYDROCODONE BITARTRATE AND ACETAMINOPHEN 5; 325 MG/1; MG/1
1 TABLET ORAL EVERY 4 HOURS PRN
Qty: 12 TABLET | Refills: 0 | Status: SHIPPED | OUTPATIENT
Start: 2021-10-12 | End: 2021-10-15

## 2021-10-12 RX ADMIN — KETOROLAC TROMETHAMINE 30 MG: 30 INJECTION, SOLUTION INTRAMUSCULAR; INTRAVENOUS at 16:00

## 2021-10-12 ASSESSMENT — ENCOUNTER SYMPTOMS
SORE THROAT: 0
ABDOMINAL PAIN: 0
EYE PAIN: 0
VOMITING: 0
COUGH: 0
DIARRHEA: 0
SHORTNESS OF BREATH: 0
SINUS PAIN: 0
BACK PAIN: 1
NAUSEA: 0

## 2021-10-12 ASSESSMENT — PAIN SCALES - GENERAL
PAINLEVEL_OUTOF10: 8
PAINLEVEL_OUTOF10: 10

## 2021-10-12 NOTE — ED PROVIDER NOTES
[facility] ED  Faculty Attestation    I performed a history and physical examination of the patient and discussed management with the resident. I reviewed the residents note and agree with the documented findings and plan of care. Any areas of disagreement are noted on the chart. I was personally present for the key portions of any procedures. I have documented in the chart those procedures where I was not present during the key portions. I have reviewed the emergency nurses triage note. I agree with the chief complaint, past medical history, past surgical history, allergies, medications, social, and family history as documented unless otherwise noted below.        This is a 40-year-old female who presents to the emergency department for evaluation of left upper posterior flank pain  Patient describes pain \"between the ribs\"  No fall or injury  No rash or overlying skin change  Midline cervical, thoracic, or lumbar pain  Pain has been present for several weeks  She indicates that she was here in this emergency department about 2 weeks ago for similar and was discharged  She continues to have discomfort bu has not yet had outpatient follow-up for this issue  Pain somewhat radiates to the lateral left flank and is worse with certain movements  No dysuria or hematuria  No primary abdominal discomfort  No fever, chest pain, or acute pulmonary complaints  She has no additional concerns at this time  Review of systems otherwise negative    On examination she appears in no acute distress  Heart is regular in rate and rhythm  Lungs are clear to auscultation  Abdomen soft and nontender  No midline thoracic or lumbar discomfort to palpation  Patient has vague discomfort in the left posterior lower ribs without rash or overlying skin change    Recent records and ED visit reviewed    Labs reviewed  No evidence for UTI    CT imaging showing probable small distal left ureteral stone  Stable for outpatient follow up      Elke Beth DO  Attending Emergency Physician          David Daniels DO  10/12/21 1817

## 2021-10-12 NOTE — ED PROVIDER NOTES
[facility] ED  Emergency Department Encounter  EmergencyMedicineResident     This patient was seen during the COVID-19 crisis. There were limited resources and those resources we did have had to be conserved for the sickest of patients. Pt Name: Zhao Monroy  MRN: [id number]  [place] 1970  Date of evaluation: 10/12/21  PCP: Octavio Abreu PA-C    [address] [place] [address]       Chief Complaint   Patient presents with    Flank Pain     left side over a week now 7/10       HISTORY OF PRESENT ILLNESS  (Location/Symptom, Timing/Onset, Context/Setting, Quality, Duration, Modifying Factors, Severity.)      Zhao Monroy is a 46 y.o. female who presents for evaluation of left flank pain. Patient states she had the symptoms for the last 2 weeks. 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 Patient was seen at an outlying ED a week ago had a urinalysis was negative she was treated for back pain. Patient states that she did not fill her prescription for pain medications and did not go to the urologist that she was referred to. She did go follow-up with her PCP who ordered more lab work-up and imaging for her to further address her left flank pain however she is unable to to complete those tests. PAST MEDICAL / SURGICAL /SOCIAL / FAMILY HISTORY      has a past medical history of Cancer [facility]), Cerebral artery occlusion with cerebral infarction ([facility] [address] Hyperlipidemia, Psychiatric problem, and Thyroid disease. has no past surgical history on file.       Social History     Socioeconomic History    Marital status: Single     Spouse name: Not on file    Number of children: Not on file    Years of education: Not on file    Highest education level: Not on file   Occupational History    Not on file   Tobacco Use    Smoking status: Current Every Day Smoker     Packs/day: 0.50     Years: 3.00     Pack years: 1.50     Types: Cigarettes    Smokeless tobacco: Never Used   Vaping Use    Vaping Use: Never used   Substance and Sexual Activity    Alcohol use: Not Currently    Drug use: Yes     Types: Marijuana    Sexual activity: Not on file   Other Topics Concern    Not on file   Social History Narrative    Not on file     Social Determinants of Health     Financial Resource Strain:     Difficulty of Paying Living Expenses:    Food Insecurity:     Worried About Running Out of Food in the Last Year:     [address] in the Last Year:    Transportation Needs:     Lack of Transportation (Medical):  Lack of Transportation (Non-Medical):    Physical Activity:     Days of Exercise per Week:     Minutes of Exercise per Session:    Stress:     Feeling of Stress :    Social Connections:     Frequency of Communication with Friends and Family:     Frequency of Social Gatherings with Friends and Family:     Attends Church Services:     Active Member of Clubs or Organizations:     Attends Club or Organization Meetings:     Marital Status:    Intimate Partner Violence:     Fear of Current or Ex-Partner:     Emotionally Abused:     Physically Abused:     Sexually Abused:        No family history on file. Allergies:  Patient has no known allergies. Home Medications:  Prior to Admission medications    Medication Sig Start Date End Date Taking? Authorizing Provider   HYDROcodone-acetaminophen (NORCO) 5-325 MG per tablet Take 1 tablet by mouth every 4 hours as needed for Pain for up to 3 days. Intended supply: 3 days. Take lowest dose possible to manage pain 10/12/21 10/15/21 Yes James Tracy MD   lidocaine (LIDODERM) 5 % Place 1 patch onto the skin daily for 10 days 12 hours on, 12 hours off.  10/2/21 10/12/21  Tashia Check, DO   cyclobenzaprine (FLEXERIL) 10 MG tablet Take 1 tablet by mouth 3 times daily as needed for Muscle spasms 10/2/21 10/12/21  Tashia Check, DO   naproxen (NAPROSYN) 500 MG tablet Take 1 tablet by mouth 2 times daily as needed for Pain 10/2/21   Toribio Corbett,    permethrin (ELIMITE) 5 % cream Apply topically as directed 6/29/20   Leyla Leblanc PA-C   amphetamine-dextroamphetamine (ADDERALL) 20 MG tablet Take 1 tablet by mouth 2 times daily for 30 days. 6/8/20 7/8/20  Lakesha Gracia DO   aspirin-acetaminophen-caffeine (EXCEDRIN MIGRAINE) 198-099-30 MG per tablet Take 1 tablet by mouth every 6 hours as needed for Headaches 5/31/20   Spencerlyfaith Leyla, APRN - CNP   divalproex (DEPAKOTE ER) 250 MG extended release tablet Take 1 tablet by mouth 2 times daily 5/31/20   Spencerlyfaith Leyla, APRN - CNP   gabapentin (NEURONTIN) 300 MG capsule Take 1 capsule by mouth 2 times daily for 30 days.  5/31/20 6/30/20  Spencerlyfaith MorelandLeyla, APRN - CNP   topiramate (TOPAMAX) 100 MG tablet Take 1 tablet by mouth nightly 5/31/20   Spencerlyfaith Leyla, APRN - CNP   topiramate (TOPAMAX) 50 MG tablet Take 1 tablet by mouth daily 5/31/20   Spencerlyfaith Leyla, APRN - CNP   DULoxetine (CYMBALTA) 60 MG extended release capsule Take 1 capsule by mouth daily 6/1/20   Spencerlyfaith Leyla, APRN - CNP   traZODone (DESYREL) 50 MG tablet Take 1 tablet by mouth nightly as needed for Sleep 5/31/20   Spencerlyfaith Leyla, APRN - CNP   ARIPiprazole (ABILIFY) 2 MG tablet Take 1 tablet by mouth nightly 5/31/20   Leifendlyfaith Leyla, APRN - CNP   folic acid (FOLVITE) 1 MG tablet Take 1 tablet by mouth daily 6/1/20   Spencerlyfaith Leyla, APRN - CNP   SUMAtriptan (IMITREX) 100 MG tablet Take 1 tablet by mouth daily as needed for Migraine 5/31/20   Leifendlyn Leyla, APRN - CNP   tiZANidine (ZANAFLEX) 2 MG tablet Take 1 tablet by mouth daily as needed (muscle pain) 5/31/20   Leifendlyn Leyla, APRN - CNP   levothyroxine (SYNTHROID) 175 MCG tablet Take 1 tablet by mouth daily 5/31/20   AILYN Austin CNP   Cholecalciferol (VITAMIN D3) 1.25 MG (47165 UT) CAPS Take 1 capsule by mouth once a week 5/31/20   AILYN Austin CNP       REVIEW OF SYSTEMS    (2-9 systems for level 4, 10 or more forlevel 5)      Review of Systems   Constitutional: Negative for activity change, chills and fever. HENT: Negative for congestion, sinus pain and sore throat. Eyes: Negative for pain and visual disturbance. Respiratory: Negative for cough and shortness of breath. Cardiovascular: Negative for chest pain. Gastrointestinal: Negative for abdominal pain, diarrhea, nausea and vomiting. Genitourinary: Positive for flank pain, urgency and vaginal discharge. Negative for dysuria and hematuria. Musculoskeletal: Positive for back pain. Negative for myalgias. Skin: Negative for rash and wound. Neurological: Negative for dizziness, light-headedness and headaches. Psychiatric/Behavioral: Negative for agitation and confusion. PHYSICAL EXAM   (up to 7 for level 4, 8 or more forlevel 5)      ED TRIAGE VITALS BP: (!) 111/56, Temp: 97.2 °F (36.2 °C), Pulse: 57, Resp: 20, SpO2: 98 %    Vitals:    10/12/21 1406 10/12/21 1553   BP: (!) 111/56    Pulse: 57    Resp: 20    Temp: 97.2 °F (36.2 °C)    TempSrc: Oral    SpO2: 98%    Weight:  110 lb (49.9 kg)   Height:  5' 7\" (1.702 m)         Physical Exam  Vitals and nursing note reviewed. Constitutional:       Appearance: Normal appearance. HENT:      Head: Normocephalic and atraumatic. Nose: Nose normal.      Mouth/Throat:      Mouth: Mucous membranes are moist.   Eyes:      Extraocular Movements: Extraocular movements intact. Pupils: Pupils are equal, round, and reactive to light. Cardiovascular:      Rate and Rhythm: Normal rate and regular rhythm. Pulses: Normal pulses. Heart sounds: Normal heart sounds. Pulmonary:      Effort: Pulmonary effort is normal.      Breath sounds: Normal breath sounds. Abdominal:      General: Abdomen is flat. Palpations: Abdomen is soft. Tenderness: There is left CVA tenderness.    Musculoskeletal:         General: Normal range of motion. Cervical back: Normal range of motion. Skin:     General: Skin is warm and dry. Capillary Refill: Capillary refill takes less than 2 seconds. Neurological:      General: No focal deficit present. Mental Status: She is alert and oriented to person, place, and time. Psychiatric:         Mood and Affect: Mood normal.         Behavior: Behavior normal.           DIFFERENTIAL  DIAGNOSIS     PLAN (LABS / IMAGING / EKG):  Orders Placed This Encounter   Procedures    Culture, Urine    CT ABDOMEN PELVIS WO CONTRAST Additional Contrast? None    BASIC METABOLIC PANEL    Urinalysis, reflex to microscopic    CBC Auto Differential    PREVIOUS SPECIMEN    SPECIMEN REJECTION       MEDICATIONS ORDERED:  ED Medication Orders (From admission, onward)    Start Ordered     Status Ordering Provider    10/12/21 1545 10/12/21 1532  ketorolac (TORADOL) injection 30 mg  ONCE      Last MAR action: Given by Other - by MCFARLAND-CLEMONS, Garnett Blizzard on 10/12/21 at [place] [address], [place]          DDX: Pyelonephritis, nephrolithiasis, UTI, musculoskeletal pain    DIAGNOSTIC RESULTS / EMERGENCY DEPARTMENT COURSE / MDM     IMPRESSION & INITIAL PLAN:  17-year-old female presented emergent evaluation of left flank pain. Pain started approximately 2 weeks ago. She was seen at [facility] facility had a urinalysis that was negative was treated for musculoskeletal back pain. States pain is not resolved. She can follow-up with her PCP who presents provided her with a course of steroids that did not resolve her pain either. On exam she is anxious with left leg pain. My bedside ultrasound did not find any large hydronephrosis. Plan for CT abdomen without contrast, blood work and urinalysis. Will provide pain control and reevaluate. CT abdomen without contrast did confirm a 2 mm stone. Patient provided with analgesia and encouraged aggressive fluid hydration.   Patient nontoxic-appearing afebrile without signs of urinary tract infection. Patient given urology follow-up for establishment of care as an outpatient. And encouraged return emergency department if symptoms worsen. LABS:  Results for orders placed or performed during the hospital encounter of 31/98/49   BASIC METABOLIC PANEL   Result Value Ref Range    Glucose 98 70 - 99 mg/dL    BUN 14 6 - 20 mg/dL    CREATININE 0.57 0.50 - 0.90 mg/dL    Bun/Cre Ratio NOT REPORTED 9 - 20    Calcium 9.4 8.6 - 10.4 mg/dL    Sodium 143 135 - 144 mmol/L    Potassium 4.3 3.7 - 5.3 mmol/L    Chloride 109 (H) 98 - 107 mmol/L    CO2 24 20 - 31 mmol/L    Anion Gap 10 9 - 17 mmol/L    GFR Non-African American >60 >60 mL/min    GFR African American >60 >60 mL/min    GFR Comment          GFR Staging NOT REPORTED    Urinalysis, reflex to microscopic   Result Value Ref Range    Color, UA Yellow Yellow    Turbidity UA Clear Clear    Glucose, Ur NEGATIVE NEGATIVE    Bilirubin Urine NEGATIVE NEGATIVE    Ketones, Urine NEGATIVE NEGATIVE    Specific Gravity, UA 1.008 1.005 - 1.030    Urine Hgb NEGATIVE NEGATIVE    pH, UA 7.0 5.0 - 8.0    Protein, UA NEGATIVE NEGATIVE    Urobilinogen, Urine Normal Normal    Nitrite, Urine NEGATIVE NEGATIVE    Leukocyte Esterase, Urine NEGATIVE NEGATIVE    Urinalysis Comments       Microscopic exam not performed based on chemical results unless requested in original order. SPECIMEN REJECTION   Result Value Ref Range    Specimen Source . BLOOD     Ordered Test CDP     Reason for Rejection Unable to perform testing: Specimen clotted. - NOT REPORTED        RADIOLOGY:  CT ABDOMEN PELVIS WO CONTRAST Additional Contrast? None   Final Result   1. Nonobstructing parenchymal nephrolith inferior pole of the left kidney. 2.  2 mm calculus distal left ureter without significant ureteral distension   consistent with minimally obstructing left ureteral calculus.       3.  Kidney is malrotated and ptotic with mild pelviectasis without   identifiable ureteral

## 2021-10-13 LAB
CULTURE: NORMAL
Lab: NORMAL
SPECIMEN DESCRIPTION: NORMAL

## 2021-12-08 ENCOUNTER — APPOINTMENT (OUTPATIENT)
Dept: CT IMAGING | Age: 51
End: 2021-12-08
Payer: COMMERCIAL

## 2021-12-08 ENCOUNTER — HOSPITAL ENCOUNTER (EMERGENCY)
Age: 51
Discharge: HOME | End: 2021-12-08
Attending: EMERGENCY MEDICINE
Payer: COMMERCIAL

## 2021-12-08 VITALS
WEIGHT: 111 LBS | OXYGEN SATURATION: 95 % | TEMPERATURE: 98.2 F | RESPIRATION RATE: 16 BRPM | HEART RATE: 75 BPM | SYSTOLIC BLOOD PRESSURE: 154 MMHG | BODY MASS INDEX: 17.39 KG/M2 | DIASTOLIC BLOOD PRESSURE: 76 MMHG

## 2021-12-08 DIAGNOSIS — R10.9 LEFT FLANK PAIN: Primary | ICD-10-CM

## 2021-12-08 DIAGNOSIS — N30.00 ACUTE CYSTITIS WITHOUT HEMATURIA: ICD-10-CM

## 2021-12-08 LAB
-: NORMAL
ABSOLUTE EOS #: 0.07 K/UL (ref 0–0.44)
ABSOLUTE IMMATURE GRANULOCYTE: <0.03 K/UL (ref 0–0.3)
ABSOLUTE LYMPH #: 1.43 K/UL (ref 1.1–3.7)
ABSOLUTE MONO #: 0.3 K/UL (ref 0.1–1.2)
ALBUMIN SERPL-MCNC: 4.7 G/DL (ref 3.5–5.2)
ALBUMIN/GLOBULIN RATIO: 1.9 (ref 1–2.5)
ALP BLD-CCNC: 68 U/L (ref 35–104)
ALT SERPL-CCNC: 19 U/L (ref 5–33)
AMORPHOUS: NORMAL
ANION GAP SERPL CALCULATED.3IONS-SCNC: 15 MMOL/L (ref 9–17)
AST SERPL-CCNC: 20 U/L
BACTERIA: NORMAL
BASOPHILS # BLD: 1 % (ref 0–2)
BASOPHILS ABSOLUTE: 0.05 K/UL (ref 0–0.2)
BILIRUB SERPL-MCNC: 0.33 MG/DL (ref 0.3–1.2)
BILIRUBIN URINE: ABNORMAL
BUN BLDV-MCNC: 15 MG/DL (ref 6–20)
BUN/CREAT BLD: ABNORMAL (ref 9–20)
CALCIUM SERPL-MCNC: 9.4 MG/DL (ref 8.6–10.4)
CASTS UA: NORMAL /LPF (ref 0–8)
CHLORIDE BLD-SCNC: 108 MMOL/L (ref 98–107)
CO2: 20 MMOL/L (ref 20–31)
COLOR: ABNORMAL
COMMENT UA: ABNORMAL
CREAT SERPL-MCNC: 0.74 MG/DL (ref 0.5–0.9)
CRYSTALS, UA: NORMAL /HPF
DIFFERENTIAL TYPE: NORMAL
EOSINOPHILS RELATIVE PERCENT: 2 % (ref 1–4)
EPITHELIAL CELLS UA: NORMAL /HPF (ref 0–5)
GFR AFRICAN AMERICAN: >60 ML/MIN
GFR NON-AFRICAN AMERICAN: >60 ML/MIN
GFR SERPL CREATININE-BSD FRML MDRD: ABNORMAL ML/MIN/{1.73_M2}
GFR SERPL CREATININE-BSD FRML MDRD: ABNORMAL ML/MIN/{1.73_M2}
GLUCOSE BLD-MCNC: 106 MG/DL (ref 70–99)
GLUCOSE URINE: NEGATIVE
HCG QUALITATIVE: POSITIVE
HCG QUANTITATIVE: 3 IU/L
HCT VFR BLD CALC: 43.1 % (ref 36.3–47.1)
HEMOGLOBIN: 14.1 G/DL (ref 11.9–15.1)
IMMATURE GRANULOCYTES: 0 %
KETONES, URINE: ABNORMAL
LEUKOCYTE ESTERASE, URINE: ABNORMAL
LIPASE: 27 U/L (ref 13–60)
LYMPHOCYTES # BLD: 32 % (ref 24–43)
MCH RBC QN AUTO: 32 PG (ref 25.2–33.5)
MCHC RBC AUTO-ENTMCNC: 32.7 G/DL (ref 28.4–34.8)
MCV RBC AUTO: 97.7 FL (ref 82.6–102.9)
MONOCYTES # BLD: 7 % (ref 3–12)
MUCUS: NORMAL
NITRITE, URINE: POSITIVE
NRBC AUTOMATED: 0 PER 100 WBC
OTHER OBSERVATIONS UA: NORMAL
PDW BLD-RTO: 13.2 % (ref 11.8–14.4)
PH UA: 6.5 (ref 5–8)
PLATELET # BLD: 230 K/UL (ref 138–453)
PLATELET ESTIMATE: NORMAL
PMV BLD AUTO: 10.7 FL (ref 8.1–13.5)
POTASSIUM SERPL-SCNC: 3.7 MMOL/L (ref 3.7–5.3)
PROTEIN UA: ABNORMAL
RBC # BLD: 4.41 M/UL (ref 3.95–5.11)
RBC # BLD: NORMAL 10*6/UL
RBC UA: NORMAL /HPF (ref 0–4)
RENAL EPITHELIAL, UA: NORMAL /HPF
SEG NEUTROPHILS: 58 % (ref 36–65)
SEGMENTED NEUTROPHILS ABSOLUTE COUNT: 2.68 K/UL (ref 1.5–8.1)
SODIUM BLD-SCNC: 143 MMOL/L (ref 135–144)
SPECIFIC GRAVITY UA: 1.02 (ref 1–1.03)
TOTAL PROTEIN: 7.2 G/DL (ref 6.4–8.3)
TRICHOMONAS: NORMAL
TURBIDITY: ABNORMAL
URINE HGB: NEGATIVE
UROBILINOGEN, URINE: NORMAL
WBC # BLD: 4.5 K/UL (ref 3.5–11.3)
WBC # BLD: NORMAL 10*3/UL
WBC UA: NORMAL /HPF (ref 0–5)
YEAST: NORMAL

## 2021-12-08 PROCEDURE — 74176 CT ABD & PELVIS W/O CONTRAST: CPT

## 2021-12-08 PROCEDURE — 6360000002 HC RX W HCPCS: Performed by: STUDENT IN AN ORGANIZED HEALTH CARE EDUCATION/TRAINING PROGRAM

## 2021-12-08 PROCEDURE — 96375 TX/PRO/DX INJ NEW DRUG ADDON: CPT

## 2021-12-08 PROCEDURE — 85025 COMPLETE CBC W/AUTO DIFF WBC: CPT

## 2021-12-08 PROCEDURE — 84702 CHORIONIC GONADOTROPIN TEST: CPT

## 2021-12-08 PROCEDURE — 96374 THER/PROPH/DIAG INJ IV PUSH: CPT

## 2021-12-08 PROCEDURE — 99282 EMERGENCY DEPT VISIT SF MDM: CPT

## 2021-12-08 PROCEDURE — 83690 ASSAY OF LIPASE: CPT

## 2021-12-08 PROCEDURE — 6360000004 HC RX CONTRAST MEDICATION: Performed by: STUDENT IN AN ORGANIZED HEALTH CARE EDUCATION/TRAINING PROGRAM

## 2021-12-08 PROCEDURE — 2580000003 HC RX 258: Performed by: STUDENT IN AN ORGANIZED HEALTH CARE EDUCATION/TRAINING PROGRAM

## 2021-12-08 PROCEDURE — 80053 COMPREHEN METABOLIC PANEL: CPT

## 2021-12-08 PROCEDURE — 84703 CHORIONIC GONADOTROPIN ASSAY: CPT

## 2021-12-08 PROCEDURE — 81001 URINALYSIS AUTO W/SCOPE: CPT

## 2021-12-08 RX ORDER — CEPHALEXIN 500 MG/1
500 CAPSULE ORAL 4 TIMES DAILY
Qty: 28 CAPSULE | Refills: 0 | Status: SHIPPED | OUTPATIENT
Start: 2021-12-08 | End: 2021-12-15

## 2021-12-08 RX ORDER — SODIUM CHLORIDE, SODIUM LACTATE, POTASSIUM CHLORIDE, AND CALCIUM CHLORIDE .6; .31; .03; .02 G/100ML; G/100ML; G/100ML; G/100ML
1000 INJECTION, SOLUTION INTRAVENOUS ONCE
Status: COMPLETED | OUTPATIENT
Start: 2021-12-08 | End: 2021-12-08

## 2021-12-08 RX ORDER — SENNA AND DOCUSATE SODIUM 50; 8.6 MG/1; MG/1
1 TABLET, FILM COATED ORAL DAILY
Qty: 30 TABLET | Refills: 0 | Status: SHIPPED | OUTPATIENT
Start: 2021-12-08 | End: 2022-01-07

## 2021-12-08 RX ORDER — ONDANSETRON 2 MG/ML
4 INJECTION INTRAMUSCULAR; INTRAVENOUS ONCE
Status: COMPLETED | OUTPATIENT
Start: 2021-12-08 | End: 2021-12-08

## 2021-12-08 RX ORDER — KETOROLAC TROMETHAMINE 30 MG/ML
30 INJECTION, SOLUTION INTRAMUSCULAR; INTRAVENOUS ONCE
Status: COMPLETED | OUTPATIENT
Start: 2021-12-08 | End: 2021-12-08

## 2021-12-08 RX ADMIN — ONDANSETRON 4 MG: 2 INJECTION INTRAMUSCULAR; INTRAVENOUS at 20:03

## 2021-12-08 RX ADMIN — KETOROLAC TROMETHAMINE 30 MG: 30 INJECTION, SOLUTION INTRAMUSCULAR; INTRAVENOUS at 20:03

## 2021-12-08 RX ADMIN — SODIUM CHLORIDE, POTASSIUM CHLORIDE, SODIUM LACTATE AND CALCIUM CHLORIDE 1000 ML: 600; 310; 30; 20 INJECTION, SOLUTION INTRAVENOUS at 20:02

## 2021-12-08 RX ADMIN — IOPAMIDOL 75 ML: 755 INJECTION, SOLUTION INTRAVENOUS at 22:21

## 2021-12-08 ASSESSMENT — ENCOUNTER SYMPTOMS
VOMITING: 1
SORE THROAT: 0
TROUBLE SWALLOWING: 0
SHORTNESS OF BREATH: 0
ABDOMINAL PAIN: 0
CONSTIPATION: 0
DIARRHEA: 0
NAUSEA: 1

## 2021-12-08 ASSESSMENT — PAIN DESCRIPTION - LOCATION: LOCATION: FLANK

## 2021-12-08 ASSESSMENT — PAIN SCALES - GENERAL
PAINLEVEL_OUTOF10: 7
PAINLEVEL_OUTOF10: 8

## 2021-12-08 NOTE — ED TRIAGE NOTES
Pt came into er in [place]   Pt reports left flank pain x2 months   Pt states shes gotten ct scans and xrays down for it and was told her has a stone but was never told to do anything about it   Pt states shes was told she has a stone stuck but also that it \"probaley already passed\"   Pt reports pain is 8/10 now   Pt reports n/v and not eating much at all the last 3 days due to the pain   Pt alert and oriented x4

## 2021-12-09 NOTE — ED PROVIDER NOTES
Juan Doyle MD  12/08/21 1937  Dragon down    Left CVA pain, Left mid abdominal pain, no pain an McBurney's point. Know left ureteral calculus 2mm. Juan Doyle MD  12/08/21 [facility]     Emergency Department     Faculty Attestation    I performed a history and physical examination of the patient and discussed management with the resident. I reviewed the resident´s note and agree with the documented findings and plan of care. Any areas of disagreement are noted on the chart. I was personally present for the key portions of any procedures. I have documented in the chart those procedures where I was not present during the key portions. I have reviewed the emergency nurses triage note. I agree with the chief complaint, past medical history, past surgical history, allergies, medications, social and family history as documented unless otherwise noted below. For Physician Assistant/ Nurse Practitioner cases/documentation I have personally evaluated this patient and have completed at least one if not all key elements of the E/M (history, physical exam, and MDM). Additional findings are as noted.        Juan Doyle MD  12/08/21 2009

## 2021-12-09 NOTE — ED PROVIDER NOTES
[address] [facility]  Emergency Department  Emergency Medicine Resident Sign-out     Care of Isabela Garcia was assumed from Dr. Wyatt Mcadams and is being seen for Flank Pain (left sided flank pain x2 months,)  . The patient's initial evaluation and plan have been discussed with the prior provider who initially evaluated the patient. EMERGENCY DEPARTMENT COURSE / MEDICAL DECISION MAKING:       MEDICATIONS GIVEN:  Orders Placed This Encounter   Medications    lactated ringers bolus    ketorolac (TORADOL) injection 30 mg    ondansetron (ZOFRAN) injection 4 mg    DISCONTD: iopamidol (ISOVUE-370) 76 % injection 75 mL       LABS / RADIOLOGY:     Labs Reviewed   COMPREHENSIVE METABOLIC PANEL W/ REFLEX TO MG FOR LOW K - Abnormal; Notable for the following components:       Result Value    Glucose 106 (*)     Chloride 108 (*)     All other components within normal limits   HCG, SERUM, QUALITATIVE - Abnormal; Notable for the following components:    hCG Qual POSITIVE (*)     All other components within normal limits   CBC WITH AUTO DIFFERENTIAL   LIPASE   HCG, QUANTITATIVE, PREGNANCY   URINE RT REFLEX TO CULTURE   HCG, QUANTITATIVE, PREGNANCY       No results found. RECENT VITALS:     Temp: 98.2 °F (36.8 °C),  Pulse: 75, Resp: 16, BP: (!) 154/76, SpO2: 95 %        This patient is a 46 y.o. Female history 2 months persistent left flank pain. Previous diagnosis of ureterolithiasis approximately 3 mm stone. Has seen PCP and urology regarding this, they have presumed that it has passed based on its location and previous imaging however patient feels that she is being dismissed because she has a female. As such laboratory work and repeat CT imaging obtained.   Delay in CT as patient has a positive pregnancy test on the qualitative but quantitative was 3 which is within normal limits for postmenopausal.  Laboratory work-up within normal limits, awaiting CT Noncon of the abdomen and pelvis for reevaluation of ureterolithiasis. Awaiting urine sample. Symptomatically improved with Toradol and Zofran. Plan for stone management as identified by imaging. Needs follow-up of urinalysis. CT shows constipation but no nephrolithiasis. Urinalysis shows possible urinary tract infection given symptoms will cover with Keflex. Patient reassessed and reports symptomatic improvement. Will discharge home with Keflex, Zofran as well as stool softener. OUTSTANDING TASKS / RECOMMENDATIONS:    1. CT  2. UA  3. Reevaluate and dispo     FINAL IMPRESSION:     1. Left flank pain        DISPOSITION:         DISPOSITION:  [x]  Discharge   []  Transfer -    []  Admission -     []  Against Medical Advice   []  Eloped   FOLLOW-UP: No follow-up provider specified.    DISCHARGE MEDICATIONS: New Prescriptions    No medications on file          Latoya Sierra DO  Emergency Medicine Resident  [facility]       Latoya Sierra [place]  Resident  12/09/21 2739

## 2021-12-09 NOTE — ED PROVIDER NOTES
[facility]  ED  Emergency Department Encounter  Emergency Medicine Resident     Pt Name:Laura Ann  MRN: [id number]  [place] 1970  Date of evaluation: 12/8/21  PCP:  Parminder Alvarez PA-C    CHIEF COMPLAINT       Chief Complaint   Patient presents with    Flank Pain     left sided flank pain x2 months,       HISTORY OF PRESENT ILLNESS  (Location/Symptom, Timing/Onset, Context/Setting, Quality, Duration, Modifying Factors, Severity.)      Rosio Stanley is a 46 y.o. female who presents with 2 months persistent and unremitting left-sided flank pain. Previously diagnosed with left-sided nephrolithiasis 2 mm, following with PCP and urology. Both noted that stone will likely pass on its own without intervention. Patient had a pelvic x-ray which then noted a 4.5 mm stone which she believes is either new stones forming of the original growing and that her concerns are not being taken seriously. Accompanied by 3 days persistent nausea and vomiting, unable to tolerate p.o. intake or keep medications down. Feels urine output is decreased due to nausea and vomiting. History of chronic constipation has not been able to take her laxatives. Unknown last bowel movement. No fevers chills headache vision changes chest pain shortness of breath or dysuria. PAST MEDICAL / SURGICAL / SOCIAL / FAMILY HISTORY      has a [facility] of [facility] Cerebral artery occlusion with cerebral infarction ([facility] [address] Hyperlipidemia, Psychiatric problem, and Thyroid disease. has no past surgical history on file. No pertinent surgical history on review with patient/family.     Social History     Socioeconomic History    Marital status: Single     Spouse name: Not on file    Number of children: Not on file    Years of education: Not on file    Highest education level: Not on file   Occupational History    Not on file   Tobacco Use    Smoking status: Current Every Day Smoker     Packs/day: 0.50 Years: 3.00     Pack years: 1.50     Types: Cigarettes    Smokeless tobacco: Never Used   Vaping Use    Vaping Use: Never used   Substance and Sexual Activity    Alcohol use: Not Currently    Drug use: Yes     Types: Marijuana Berneta Adilson)    Sexual activity: Not on file   Other Topics Concern    Not on file   Social History Narrative    Not on file     Social Determinants of Health     Financial Resource Strain:     Difficulty of Paying Living Expenses: Not on file   Food Insecurity:     Worried About Running Out of Food in the Last Year: Not on file    Rakesh of Food in the Last Year: Not on file   Transportation Needs:     Lack of Transportation (Medical): Not on file    Lack of Transportation (Non-Medical): Not on file   Physical Activity:     Days of Exercise per Week: Not on file    Minutes of Exercise per Session: Not on file   Stress:     Feeling of Stress : Not on file   Social Connections:     Frequency of Communication with Friends and Family: Not on file    Frequency of Social Gatherings with Friends and Family: Not on file    Attends Confucianist Services: Not on file    Active Member of [address] [place] [address] or Organizations: Not on file    Attends Club or Organization Meetings: Not on file    Marital Status: Not on file   Intimate Partner Violence:     Fear of Current or Ex-Partner: Not on file    Emotionally Abused: Not on file    Physically Abused: Not on file    Sexually Abused: Not on file   Housing Stability:     Unable to Pay for Housing in the Last Year: Not on file    Number of [place] in the Last Year: Not on file    Unstable Housing in the Last Year: Not on file       History reviewed. No pertinent family history. Allergies:  Patient has no known allergies. Home Medications:  Prior to Admission medications    Medication Sig Start Date End Date Taking?  Authorizing Provider   cephALEXin (KEFLEX) 500 MG capsule Take 1 capsule by mouth 4 times daily for 7 days 12/8/21 12/15/21 Yes Champ Josh, DO   sennosides-docusate sodium (SENOKOT-S) 8.6-50 MG tablet Take 1 tablet by mouth daily 12/8/21 1/7/22 Yes Champ Josh, DO   naproxen (NAPROSYN) 500 MG tablet Take 1 tablet by mouth 2 times daily as needed for Pain 10/2/21   Ant Little, DO   permethrin (ELIMITE) 5 % cream Apply topically as directed 6/29/20   Annabelle Leblanc PA-C   amphetamine-dextroamphetamine (ADDERALL) 20 MG tablet Take 1 tablet by mouth 2 times daily for 30 days. 6/8/20 7/8/20  Cathy Toscano, DO   aspirin-acetaminophen-caffeine (EXCEDRIN MIGRAINE) 858-551-56 MG per tablet Take 1 tablet by mouth every 6 hours as needed for Headaches 5/31/20   Clydie Bottoms, APRN - CNP   divalproex (DEPAKOTE ER) 250 MG extended release tablet Take 1 tablet by mouth 2 times daily 5/31/20   Clydie Bottoms, APRN - CNP   gabapentin (NEURONTIN) 300 MG capsule Take 1 capsule by mouth 2 times daily for 30 days.  5/31/20 6/30/20  Clydie Bottoms, APRN - CNP   topiramate (TOPAMAX) 100 MG tablet Take 1 tablet by mouth nightly 5/31/20   Clydie Bottoms, APRN - CNP   topiramate (TOPAMAX) 50 MG tablet Take 1 tablet by mouth daily 5/31/20   Clydie Bottoms, APRN - CNP   DULoxetine (CYMBALTA) 60 MG extended release capsule Take 1 capsule by mouth daily 6/1/20   Clydie Bottoms, APRN - CNP   traZODone (DESYREL) 50 MG tablet Take 1 tablet by mouth nightly as needed for Sleep 5/31/20   Clydie Bottoms, APRN - CNP   ARIPiprazole (ABILIFY) 2 MG tablet Take 1 tablet by mouth nightly 5/31/20   Clydie Bottoms, APRN - CNP   folic acid (FOLVITE) 1 MG tablet Take 1 tablet by mouth daily 6/1/20   Clydie Bottoms, APRN - CNP   SUMAtriptan (IMITREX) 100 MG tablet Take 1 tablet by mouth daily as needed for Migraine 5/31/20   AILYN Khalil - CNP   tiZANidine (ZANAFLEX) 2 MG tablet Take 1 tablet by mouth daily as needed (muscle pain) 5/31/20   AILYN Khalil - CNP   levothyroxine (SYNTHROID) 175 MCG tablet Take 1 tablet by mouth daily 5/31/20   AILYN Camacho CNP   Cholecalciferol (VITAMIN D3) 1.25 MG (95122 UT) CAPS Take 1 capsule by mouth once a week 5/31/20   AILYN Camacho CNP       REVIEW OF SYSTEMS    (2-9 systems for level 4, 10 or more for level 5)      Review of Systems   Constitutional: Negative for chills and fever. HENT: Negative for sore throat and trouble swallowing. Respiratory: Negative for shortness of breath. Cardiovascular: Negative for chest pain. Gastrointestinal: Positive for nausea and vomiting. Negative for abdominal pain, constipation and diarrhea. Genitourinary: Positive for flank pain. Negative for dysuria and vaginal discharge. Musculoskeletal: Negative for arthralgias and myalgias. Skin: Negative for wound. Neurological: Negative for light-headedness and headaches. Psychiatric/Behavioral: Negative for behavioral problems. PHYSICAL EXAM   (up to 7 for level 4, 8 or more for level 5)      INITIAL VITALS:   BP (!) 154/76   Pulse 75   Temp 98.2 °F (36.8 °C)   Resp 16   Wt 111 lb (50.3 kg)   SpO2 95%   BMI 17.39 kg/m²     Physical Exam  Vitals and nursing note reviewed. Constitutional:       General: She is not in acute distress. Appearance: She is cachectic. She is ill-appearing (Chronically). She is not diaphoretic. HENT:      Head: Normocephalic and atraumatic. Right Ear: External ear normal.      Left Ear: External ear normal.      Nose: Nose normal.      Mouth/Throat:      Mouth: Mucous membranes are moist.      Pharynx: Uvula midline. No oropharyngeal exudate. Eyes:      General:         Right eye: No discharge. Left eye: No discharge. Cardiovascular:      Rate and Rhythm: Normal rate and regular rhythm. Heart sounds: Normal heart sounds. Pulmonary:      Effort: Pulmonary effort is normal. No respiratory distress. Abdominal:      Palpations: Abdomen is soft. Tenderness:  There is no abdominal tenderness. There is left CVA tenderness. Musculoskeletal:         General: No deformity. Normal range of motion. Cervical back: Normal range of motion. Skin:     General: Skin is warm and dry. Capillary Refill: Capillary refill takes less than 2 seconds. Neurological:      General: No focal deficit present. Mental Status: She is alert and oriented to person, place, and time. Psychiatric:         Mood and Affect: Mood is anxious. Behavior: Behavior is agitated.          DIFFERENTIAL  DIAGNOSIS     PLAN (LABS / IMAGING / EKG):  Orders Placed This Encounter   Procedures    CT ABDOMEN PELVIS WO CONTRAST Additional Contrast? None    CBC Auto Differential    Comprehensive Metabolic Panel w/ Reflex to MG    Lipase    Urinalysis Reflex to Culture    HCG Qualitative, Serum    HCG, Quantitative, Pregnancy    HCG, QUANTITATIVE, PREGNANCY    Microscopic Urinalysis    Place CT Compatible peripheral IV       MEDICATIONS ORDERED:  Orders Placed This Encounter   Medications    lactated ringers bolus    ketorolac (TORADOL) injection 30 mg    ondansetron (ZOFRAN) injection 4 mg    DISCONTD: iopamidol (ISOVUE-370) 76 % injection 75 mL    iopamidol (ISOVUE-370) 76 % injection 75 mL    cephALEXin (KEFLEX) 500 MG capsule     Sig: Take 1 capsule by mouth 4 times daily for 7 days     Dispense:  28 capsule     Refill:  0    sennosides-docusate sodium (SENOKOT-S) 8.6-50 MG tablet     Sig: Take 1 tablet by mouth daily     Dispense:  30 tablet     Refill:  0       DDX: stone vs UTI vs pyelonephritis vs health anxiety vs other    DIAGNOSTIC RESULTS / EMERGENCY DEPARTMENT COURSE / MDM     LABS:  Results for orders placed or performed during the hospital encounter of 12/08/21   CBC Auto Differential   Result Value Ref Range    WBC 4.5 3.5 - 11.3 k/uL    RBC 4.41 3.95 - 5.11 m/uL    Hemoglobin 14.1 11.9 - 15.1 g/dL    Hematocrit 43.1 36.3 - 47.1 %    MCV 97.7 82.6 - 102.9 fL    MCH 32.0 25.2 - 33.5 pg    MCHC 32.7 28.4 - 34.8 g/dL    RDW 13.2 11.8 - 14.4 %    Platelets 284 097 - 490 k/uL    MPV 10.7 8.1 - 13.5 fL    NRBC Automated 0.0 0.0 per 100 WBC    Differential Type NOT REPORTED     Seg Neutrophils 58 36 - 65 %    Lymphocytes 32 24 - 43 %    Monocytes 7 3 - 12 %    Eosinophils % 2 1 - 4 %    Basophils 1 0 - 2 %    Immature Granulocytes 0 0 %    Segs Absolute 2.68 1.50 - 8.10 k/uL    Absolute Lymph # 1.43 1.10 - 3.70 k/uL    Absolute Mono # 0.30 0.10 - 1.20 k/uL    Absolute Eos # 0.07 0.00 - 0.44 k/uL    Basophils Absolute 0.05 0.00 - 0.20 k/uL    Absolute Immature Granulocyte <0.03 0.00 - 0.30 k/uL    WBC Morphology NOT REPORTED     RBC Morphology NOT REPORTED     Platelet Estimate NOT REPORTED    Comprehensive Metabolic Panel w/ Reflex to MG   Result Value Ref Range    Glucose 106 (H) 70 - 99 mg/dL    BUN 15 6 - 20 mg/dL    CREATININE 0.74 0.50 - 0.90 mg/dL    Bun/Cre Ratio NOT REPORTED 9 - 20    Calcium 9.4 8.6 - 10.4 mg/dL    Sodium 143 135 - 144 mmol/L    Potassium 3.7 3.7 - 5.3 mmol/L    Chloride 108 (H) 98 - 107 mmol/L    CO2 20 20 - 31 mmol/L    Anion Gap 15 9 - 17 mmol/L    Alkaline Phosphatase 68 35 - 104 U/L    ALT 19 5 - 33 U/L    AST 20 <32 U/L    Total Bilirubin 0.33 0.3 - 1.2 mg/dL    Total Protein 7.2 6.4 - 8.3 g/dL    Albumin 4.7 3.5 - 5.2 g/dL    Albumin/Globulin Ratio 1.9 1.0 - 2.5    GFR Non-African American >60 >60 mL/min    GFR African American >60 >60 mL/min    GFR Comment          GFR Staging NOT REPORTED    Lipase   Result Value Ref Range    Lipase 27 13 - 60 U/L   Urinalysis Reflex to Culture    Specimen: Urine, clean catch   Result Value Ref Range    Color, UA Dark Yellow (A) Yellow    Turbidity UA Cloudy (A) Clear    Glucose, Ur NEGATIVE NEGATIVE    Bilirubin Urine MOD (A) NEGATIVE    Ketones, Urine TRACE (A) NEGATIVE    Specific Gravity, UA 1.020 1.005 - 1.030    Urine Hgb NEGATIVE NEGATIVE    pH, UA 6.5 5.0 - 8.0    Protein, UA TRACE (A) NEGATIVE    Urobilinogen, Urine Normal Normal    Nitrite, Urine POSITIVE (A) NEGATIVE    Leukocyte Esterase, Urine SMALL (A) NEGATIVE    Urinalysis Comments NOT REPORTED    HCG Qualitative, Serum   Result Value Ref Range    hCG Qual POSITIVE (A) NEGATIVE   HCG, Quantitative, Pregnancy   Result Value Ref Range    hCG Quant 3 <5 IU/L   Microscopic Urinalysis   Result Value Ref Range    -          WBC, UA 2 TO 5 0 - 5 /HPF    RBC, UA None 0 - 4 /HPF    Casts UA  0 - 8 /LPF     5 TO 10 HYALINE Reference range defined for non-centrifuged specimen. Crystals, UA NOT REPORTED None /HPF    Epithelial Cells UA 2 TO 5 0 - 5 /HPF    Renal Epithelial, UA NOT REPORTED 0 /HPF    Bacteria, UA NOT REPORTED None    Mucus, UA NOT REPORTED None    Trichomonas, UA NOT REPORTED None    Amorphous, UA NOT REPORTED None    Other Observations UA NOT REPORTED NOT REQ. Yeast, UA NOT REPORTED None         RADIOLOGY:  None    EKG  None    All EKG's are interpreted by the Emergency Department Physician who either signs or Co-signs this chart in the absence of a cardiologist.    EMERGENCY DEPARTMENT COURSE:  Pt found seated upright in bed, NAD, nontoxic. Very thin appearing. VSS w/ HTN. L CVA tenderness. Given n/v and persistent pain plan for repeat CT, labs, and sx management. No significant lab abnormality, sx improved with Toradol and Zofran. Delay in imaging due to positive preg test. Ordered TV u/s however on the quant it was WNL at 3. US d/c, ok for CT. Awaiting CT and UA at time of singout, pt care transferred to Dr. Benjamin Garcia. PROCEDURES:  None    CONSULTS:  None    CRITICAL CARE:  None    FINAL IMPRESSION      1. Left flank pain    2.  Acute cystitis without hematuria          DISPOSITION / PLAN     DISPOSITION        PATIENT REFERRED TO:  HENRRY Sandhu [id number]  [facility] [address] [facility] [address] [phone]    Schedule an appointment as soon as possible for a visit   As needed      DISCHARGE MEDICATIONS:  Discharge Medication List as of 12/8/2021 11:21 PM      START taking these medications    Details   cephALEXin (KEFLEX) 500 MG capsule Take 1 capsule by mouth 4 times daily for 7 days, Disp-28 capsule, R-0Print             Deirdre Levine MD  Emergency Medicine Resident    (Please note that portions of this note were completed with a voice recognition program.  Efforts were made to edit the dictations but occasionally words are mis-transcribed.)        Deirdre Levine MD  Resident  12/09/21 0111

## 2021-12-09 NOTE — ED NOTES
The following labs labeled with pt sticker and tubed to lab:     [] Blue     [] Lavender   [] on ice  [] Green/yellow  [] Green/black [] on ice  [] Yellow  [] Red  [] Pink      [] COVID-19 swab    [] Rapid  [] PCR  [] Peds Viral Panel     [x] Urine Sample  [] Pelvic Cultures  [] Blood Cultures          Jovan Russell RN  12/08/21 [id number]

## 2022-01-01 NOTE — PLAN OF CARE
[facility]  Initial Interdisciplinary Treatment Plan NO      Original treatment plan Date & Time: 5/27/2020 0904    Admission Type:  Admission Type: Voluntary    Reason for admission:   Reason for Admission: increased feelings of anxiety and feeling overwhelmed patient lead patient to have fleeting suicidal ideations with no plan, pt afraid if she didn't get help, she would try to harm self    Estimated Length of Stay:  5-7days  Estimated Discharge Date: to be determined by physician    PATIENT STRENGTHS:  Patient Strengths:Strengths: Communication, Connection to output provider  Patient Strengths and Limitations:Limitations: Tendency to isolate self, General negative or hopeless attitude about future/recovery, Difficult relationships / poor social skills, Difficulty problem solving/relies on others to help solve problems, Hopeless about future  Addictive Behavior: Addictive Behavior  In the past 3 months, have you felt or has someone told you that you have a problem with:  : None  Do you have a history of Chemical Use?: No  Do you have a history of Alcohol Use?: No  Do you have a history of Street Drug Abuse?: No  Histroy of Prescripton Drug Abuse?: No  Medical Problems:  Past Medical History:   Diagnosis Date    Cancer ([facility] [address]     Cerebral artery occlusion with cerebral infarction ([facility] [address]     Hyperlipidemia     Psychiatric problem     Thyroid disease      Status EXAM:     EDUCATION:   Learner Progress Toward Treatment Goals: reviewed group plans and strategies for care    Method:group therapy, medication compliance, individualized assessments and care planning    Outcome: needs reinforcement    PATIENT GOALS: to be discussed with patient within 72 hours    PLAN/TREATMENT RECOMMENDATIONS:     continue group therapy , medications compliance, goal setting, individualized assessments and care, continue to monitor pt on unit      SHORT-TERM GOALS:   Time frame for Short-Term Goals: 5-7 days    LONG-TERM Form filled  Please fax to where it needs to go, copy to be scanned and mail original to parents and update them was done  Thanks

## 2023-09-30 ENCOUNTER — HOSPITAL ENCOUNTER (EMERGENCY)
Age: 53
Discharge: HOME | End: 2023-09-30
Attending: EMERGENCY MEDICINE
Payer: COMMERCIAL

## 2023-09-30 VITALS
HEART RATE: 64 BPM | BODY MASS INDEX: 15.7 KG/M2 | HEIGHT: 67 IN | TEMPERATURE: 98 F | OXYGEN SATURATION: 98 % | DIASTOLIC BLOOD PRESSURE: 83 MMHG | RESPIRATION RATE: 16 BRPM | WEIGHT: 100 LBS | SYSTOLIC BLOOD PRESSURE: 135 MMHG

## 2023-09-30 DIAGNOSIS — F22 DELUSIONS OF PARASITOSIS (HCC): Primary | ICD-10-CM

## 2023-09-30 PROCEDURE — 99283 EMERGENCY DEPT VISIT LOW MDM: CPT

## 2023-09-30 PROCEDURE — 6370000000 HC RX 637 (ALT 250 FOR IP): Performed by: STUDENT IN AN ORGANIZED HEALTH CARE EDUCATION/TRAINING PROGRAM

## 2023-09-30 RX ORDER — OLANZAPINE 5 MG/1
10 TABLET, ORALLY DISINTEGRATING ORAL ONCE
Status: COMPLETED | OUTPATIENT
Start: 2023-09-30 | End: 2023-09-30

## 2023-09-30 RX ORDER — CHLORHEXIDINE GLUCONATE 4 G/100ML
SOLUTION TOPICAL ONCE
Status: COMPLETED | OUTPATIENT
Start: 2023-09-30 | End: 2023-09-30

## 2023-09-30 RX ADMIN — CHLORHEXIDINE GLUCONATE: 213 SOLUTION TOPICAL at 06:19

## 2023-09-30 RX ADMIN — OLANZAPINE 10 MG: 5 TABLET, ORALLY DISINTEGRATING ORAL at 06:13

## 2023-09-30 ASSESSMENT — ENCOUNTER SYMPTOMS
ABDOMINAL PAIN: 0
SHORTNESS OF BREATH: 0

## 2023-09-30 ASSESSMENT — PAIN - FUNCTIONAL ASSESSMENT: PAIN_FUNCTIONAL_ASSESSMENT: NONE - DENIES PAIN

## 2023-09-30 NOTE — ED TRIAGE NOTES
Pt ambulatory to ED 28 from triage by self. Pt stated that she has bugs crawling around under her skin. Pt stated that it went from her stomach into her mouth and then into her nose. Pt keeps walking out into the hallway stating they are in her mouth and they might kill her. Pt redirected back into room. Pt keeps walking back and forth down the hallway and to the restroom to check on the bugs that are in her mouth. Pt denies any pain. NAD noted. Care ongoing.

## 2023-09-30 NOTE — DISCHARGE INSTRUCTIONS
Please call your primary care provider for follow up. You were given medication and cleansing solution. Please use the cleaning solution on skin only. Do not use this cleaning solution anywhere near your eyes or mouth. Please return to the emergency department with any worsening symptoms.

## 2023-09-30 NOTE — ED PROVIDER NOTES
[facility] ED  Emergency Department Encounter  Emergency Medicine Resident     Pt Name:Laura Hernández  MRN: [id number]  [address] [facility] 1970  Date of evaluation: 9/30/23  PCP:  Rehan Monroy PA-C  Note Started: 5:40 AM EDT    CHIEF COMPLAINT       Chief Complaint   Patient presents with    OTHER     Pt feels bugs moving around, went into her mouth, then into her nose       HISTORY OF PRESENT ILLNESS  (Location/Symptom, Timing/Onset, Context/Setting, Quality, Duration, Modifying Factors, Severity.)      Omkar Garcia is a 52 y.o. female who presents with concerns for feeling as though she has bugs moving under her skin, and her gums, underneath her scalp, and along her hips. Patient states she can see the bugs moving underneath her skin. She reports that she knows that these bugs can kill her and that she needs some medication to kill the bugs. Patient does have significant psychiatric history. Denies any recent travel, fevers, chills, chest pain, shortness breath, abdominal, nausea, vomiting of diarrhea, suicidal ideation, homicidal ideation, auditory hallucinations. PAST MEDICAL / SURGICAL / SOCIAL / FAMILY HISTORY      has a past medical history of Cancer [facility]), Cerebral artery occlusion with cerebral infarction ([facility]), Hyperlipidemia, Psychiatric problem, and Thyroid disease. has no past surgical history on file.       Social History     Socioeconomic History    Marital status: Single     Spouse name: Not on file    Number of children: Not on file    Years of education: Not on file    Highest education level: Not on file   Occupational History    Not on file   Tobacco Use    Smoking status: Every Day     Packs/day: 0.50     Years: 3.00     Additional pack years: 0.00     Total pack years: 1.50     Types: Cigarettes    Smokeless tobacco: Never   Vaping Use    Vaping Use: Never used   Substance and Sexual Activity    Alcohol use: Not Currently    Drug use: Yes     Types: sounds. Abdominal:      Palpations: Abdomen is soft. Tenderness: There is no abdominal tenderness. Musculoskeletal:         General: Normal range of motion. Cervical back: Normal range of motion. Skin:     Findings: No erythema, lesion or rash. Neurological:      General: No focal deficit present. Mental Status: She is alert. Psychiatric:      Comments: Nervous, anxious, agitated. Delusions of parasitosis. DDX/DIAGNOSTIC RESULTS / EMERGENCY DEPARTMENT COURSE / MDM     Medical Decision Making  51 yo F presents with concerns for feeling as though she has bugs moving under her skin, and her gums, underneath her scalp, and along her hips. Patient states she can see the bugs moving underneath her skin. She reports that she knows that these bugs can kill her and that she needs some medication to kill the bugs. Patient does have significant psychiatric history. Denies any recent travel, fevers, chills, chest pain, shortness breath, abdominal, nausea, vomiting of diarrhea, suicidal ideation, homicidal ideation, auditory hallucinations. On examination, patient very anxious appearing, agitated. Refusing to sit on the bed for examination, pacing along ED hallway and requiring frequent redirection. Physical examination unremarkable, no lesions or sores on scalp or along hips or chest where patient reports seeing bugs moving under her skin, no erythema, no edema. Gingival line unremarkable, oropharynx clear. Patient currently taking antidepressant but no antipsychotic medication. Reports no recent changes in her medication doses or the medications themselves. Denies SI/HI. Denies command based auditory hallucinations. Will give zyprexa. Patient requesting \"something to kill the bugs\", will give hibiclens for external use. Patient significantly calmed after receiving hibiclens. Stressed importance of not using solution near eyes or mouth.  At this time I do not feel patient represents a

## 2023-11-02 ENCOUNTER — APPOINTMENT (OUTPATIENT)
Dept: GENERAL RADIOLOGY | Age: 53
End: 2023-11-02
Payer: COMMERCIAL

## 2023-11-02 ENCOUNTER — HOSPITAL ENCOUNTER (EMERGENCY)
Age: 53
Discharge: HOME | End: 2023-11-02
Attending: EMERGENCY MEDICINE
Payer: COMMERCIAL

## 2023-11-02 ENCOUNTER — APPOINTMENT (OUTPATIENT)
Dept: CT IMAGING | Age: 53
End: 2023-11-02
Payer: COMMERCIAL

## 2023-11-02 VITALS
DIASTOLIC BLOOD PRESSURE: 78 MMHG | BODY MASS INDEX: 18.01 KG/M2 | TEMPERATURE: 97.5 F | SYSTOLIC BLOOD PRESSURE: 142 MMHG | HEART RATE: 66 BPM | RESPIRATION RATE: 17 BRPM | WEIGHT: 115 LBS | OXYGEN SATURATION: 98 %
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
## 2023-11-02 DIAGNOSIS — S01.81XA FACIAL LACERATION, INITIAL ENCOUNTER: Primary | ICD-10-CM

## 2023-11-02 LAB
ANION GAP SERPL CALCULATED.3IONS-SCNC: 16 MMOL/L (ref 9–17)
BASOPHILS # BLD: 0.03 K/UL (ref 0–0.2)
BASOPHILS NFR BLD: 0 % (ref 0–2)
BUN SERPL-MCNC: 9 MG/DL (ref 6–20)
CALCIUM SERPL-MCNC: 9.4 MG/DL (ref 8.6–10.4)
CHLORIDE SERPL-SCNC: 96 MMOL/L (ref 98–107)
CO2 SERPL-SCNC: 22 MMOL/L (ref 20–31)
CREAT SERPL-MCNC: 0.6 MG/DL (ref 0.5–0.9)
EOSINOPHIL # BLD: <0.03 K/UL (ref 0–0.44)
EOSINOPHILS RELATIVE PERCENT: 0 % (ref 1–4)
ERYTHROCYTE [DISTWIDTH] IN BLOOD BY AUTOMATED COUNT: 13.9 % (ref 11.8–14.4)
GFR SERPL CREATININE-BSD FRML MDRD: >60 ML/MIN/1.73M2
GLUCOSE SERPL-MCNC: 107 MG/DL (ref 70–99)
HCT VFR BLD AUTO: 44 % (ref 36.3–47.1)
HGB BLD-MCNC: 14.7 G/DL (ref 11.9–15.1)
IMM GRANULOCYTES # BLD AUTO: <0.03 K/UL (ref 0–0.3)
IMM GRANULOCYTES NFR BLD: 0 %
LYMPHOCYTES NFR BLD: 1.92 K/UL (ref 1.1–3.7)
LYMPHOCYTES RELATIVE PERCENT: 20 % (ref 24–43)
MCH RBC QN AUTO: 31.5 PG (ref 25.2–33.5)
MCHC RBC AUTO-ENTMCNC: 33.4 G/DL (ref 28.4–34.8)
MCV RBC AUTO: 94.2 FL (ref 82.6–102.9)
MONOCYTES NFR BLD: 0.84 K/UL (ref 0.1–1.2)
MONOCYTES NFR BLD: 9 % (ref 3–12)
NEUTROPHILS NFR BLD: 71 % (ref 36–65)
NEUTS SEG NFR BLD: 7.02 K/UL (ref 1.5–8.1)
NRBC BLD-RTO: 0 PER 100 WBC
PLATELET # BLD AUTO: 253 K/UL (ref 138–453)
PMV BLD AUTO: 11.9 FL (ref 8.1–13.5)
POTASSIUM SERPL-SCNC: 4 MMOL/L (ref 3.7–5.3)
RBC # BLD AUTO: 4.67 M/UL (ref 3.95–5.11)
SODIUM SERPL-SCNC: 134 MMOL/L (ref 135–144)
TROPONIN I SERPL HS-MCNC: 6 NG/L (ref 0–14)
WBC OTHER # BLD: 9.8 K/UL (ref 3.5–11.3)

## 2023-11-02 PROCEDURE — 80048 BASIC METABOLIC PNL TOTAL CA: CPT

## 2023-11-02 PROCEDURE — 12011 RPR F/E/E/N/L/M 2.5 CM/<: CPT

## 2023-11-02 PROCEDURE — 84484 ASSAY OF TROPONIN QUANT: CPT

## 2023-11-02 PROCEDURE — 2580000003 HC RX 258: Performed by: EMERGENCY MEDICINE

## 2023-11-02 PROCEDURE — 85025 COMPLETE CBC W/AUTO DIFF WBC: CPT

## 2023-11-02 PROCEDURE — 96375 TX/PRO/DX INJ NEW DRUG ADDON: CPT

## 2023-11-02 PROCEDURE — 73080 X-RAY EXAM OF ELBOW: CPT

## 2023-11-02 PROCEDURE — 6360000002 HC RX W HCPCS

## 2023-11-02 PROCEDURE — 70450 CT HEAD/BRAIN W/O DYE: CPT

## 2023-11-02 PROCEDURE — 90471 IMMUNIZATION ADMIN: CPT

## 2023-11-02 PROCEDURE — 90715 TDAP VACCINE 7 YRS/> IM: CPT

## 2023-11-02 PROCEDURE — 99284 EMERGENCY DEPT VISIT MOD MDM: CPT

## 2023-11-02 PROCEDURE — 71046 X-RAY EXAM CHEST 2 VIEWS: CPT

## 2023-11-02 PROCEDURE — 96361 HYDRATE IV INFUSION ADD-ON: CPT

## 2023-11-02 PROCEDURE — 6370000000 HC RX 637 (ALT 250 FOR IP)

## 2023-11-02 PROCEDURE — 96374 THER/PROPH/DIAG INJ IV PUSH: CPT

## 2023-11-02 RX ORDER — AMOXICILLIN AND CLAVULANATE POTASSIUM 875; 125 MG/1; MG/1
1 TABLET, FILM COATED ORAL EVERY 12 HOURS SCHEDULED
Status: DISCONTINUED | OUTPATIENT
Start: 2023-11-02 | End: 2023-11-02 | Stop reason: HOSPADM

## 2023-11-02 RX ORDER — 0.9 % SODIUM CHLORIDE 0.9 %
1000 INTRAVENOUS SOLUTION INTRAVENOUS ONCE
Status: COMPLETED | OUTPATIENT
Start: 2023-11-02 | End: 2023-11-02

## 2023-11-02 RX ORDER — DEXAMETHASONE SODIUM PHOSPHATE 10 MG/ML
10 INJECTION, SOLUTION INTRAMUSCULAR; INTRAVENOUS ONCE
Status: COMPLETED | OUTPATIENT
Start: 2023-11-02 | End: 2023-11-02

## 2023-11-02 RX ORDER — OXYCODONE HYDROCHLORIDE AND ACETAMINOPHEN 5; 325 MG/1; MG/1
1 TABLET ORAL EVERY 6 HOURS PRN
Qty: 15 TABLET | Refills: 0 | Status: SHIPPED | OUTPATIENT
Start: 2023-11-02 | End: 2023-11-07

## 2023-11-02 RX ORDER — DIPHENHYDRAMINE HYDROCHLORIDE 50 MG/ML
25 INJECTION INTRAMUSCULAR; INTRAVENOUS ONCE
Status: COMPLETED | OUTPATIENT
Start: 2023-11-02 | End: 2023-11-02

## 2023-11-02 RX ORDER — PROCHLORPERAZINE EDISYLATE 5 MG/ML
10 INJECTION INTRAMUSCULAR; INTRAVENOUS ONCE
Status: COMPLETED | OUTPATIENT
Start: 2023-11-02 | End: 2023-11-02

## 2023-11-02 RX ORDER — ACETAMINOPHEN 500 MG
1000 TABLET ORAL ONCE
Status: COMPLETED | OUTPATIENT
Start: 2023-11-02 | End: 2023-11-02

## 2023-11-02 RX ORDER — AMOXICILLIN AND CLAVULANATE POTASSIUM 875; 125 MG/1; MG/1
1 TABLET, FILM COATED ORAL EVERY 12 HOURS SCHEDULED
Status: DISCONTINUED | OUTPATIENT
Start: 2023-11-02 | End: 2023-11-02

## 2023-11-02 RX ORDER — AMOXICILLIN AND CLAVULANATE POTASSIUM 875; 125 MG/1; MG/1
1 TABLET, FILM COATED ORAL 2 TIMES DAILY
Qty: 14 TABLET | Refills: 0 | Status: SHIPPED | OUTPATIENT
Start: 2023-11-02 | End: 2023-11-09

## 2023-11-02 RX ADMIN — DIPHENHYDRAMINE HYDROCHLORIDE 25 MG: 50 INJECTION INTRAMUSCULAR; INTRAVENOUS at 16:37

## 2023-11-02 RX ADMIN — AMOXICILLIN AND CLAVULANATE POTASSIUM 1 TABLET: 875; 125 TABLET, FILM COATED ORAL at 17:54

## 2023-11-02 RX ADMIN — SODIUM CHLORIDE 1000 ML: 9 INJECTION, SOLUTION INTRAVENOUS at 16:38

## 2023-11-02 RX ADMIN — DEXAMETHASONE SODIUM PHOSPHATE 10 MG: 10 INJECTION, SOLUTION INTRAMUSCULAR; INTRAVENOUS at 16:37

## 2023-11-02 RX ADMIN — ACETAMINOPHEN 1000 MG: 500 TABLET ORAL at 16:37

## 2023-11-02 RX ADMIN — TETANUS TOXOID, REDUCED DIPHTHERIA TOXOID AND ACELLULAR PERTUSSIS VACCINE, ADSORBED 0.5 ML: 5; 2.5; 8; 8; 2.5 SUSPENSION INTRAMUSCULAR at 17:54

## 2023-11-02 RX ADMIN — PROCHLORPERAZINE EDISYLATE 10 MG: 5 INJECTION INTRAMUSCULAR; INTRAVENOUS at 16:37

## 2023-11-02 NOTE — DISCHARGE INSTRUCTIONS
Seen in the emergency department after a fall. We did CT scan of your head which was unremarkable. You were found to have 2 rib fractures on the left side. Additionally you had a laceration in your ear. We applied sutures to the right ear, you will need to follow-up with plastic surgery on Monday for further management and removal of the sutures. Additionally we will give the Roxicodone, please use this every 6 hours as needed for symptomatic relief. Do not drive or operate machinery after taking this medication. Patient will need to take Augmentin twice a day for the next 7 days. For pain use acetaminophen (Tylenol) or ibuprofen (Motrin / Advil), unless prescribed medications that have acetaminophen or ibuprofen (or similar medications) in it. You can take over the counter acetaminophen tablets (1 - 2 tablets of the 500-mg strength every 6 hours) or ibuprofen tablets (2 tablets every 4 hours). PLEASE RETURN TO THE EMERGENCY DEPARTMENT IMMEDIATELY for worsening of pain, decrease sensation to arms or legs, inability to move arms or legs, shortness of breath, severe chest pain, excessive nausea or vomiting, notice any bruising to your abdomen or have increase in abdominal pain, or if you develop any concerning symptoms such as: high fever not relieved by acetaminophen (Tylenol) and/or ibuprofen (Motrin / Advil), chills, feeling of your heart fluttering or racing, persistent nausea and/or vomiting, vomiting up blood, blood in your stool, loss of consciousness, numbness, weakness or tingling in the arms or legs or change in color of the extremities, changes in mental status, persistent headache, blurry vision, loss of bladder / bowel control, unable to follow up with your physician, or other any other care or concern.

## 2023-11-02 NOTE — ED TRIAGE NOTES
Pt to ED for a fall last night. Unknown LOC. Pt states hx of ataxia. Pt has laceration to R ear from fall.

## 2024-08-20 ENCOUNTER — OFFICE VISIT (OUTPATIENT)
Dept: ORTHOPEDIC SURGERY | Age: 54
End: 2024-08-20
Payer: COMMERCIAL

## 2024-08-20 DIAGNOSIS — S82.034A CLOSED NONDISPLACED TRANSVERSE FRACTURE OF RIGHT PATELLA, INITIAL ENCOUNTER: Primary | ICD-10-CM

## 2024-08-20 PROCEDURE — 1036F TOBACCO NON-USER: CPT | Performed by: PHYSICIAN ASSISTANT

## 2024-08-20 PROCEDURE — 99204 OFFICE O/P NEW MOD 45 MIN: CPT | Performed by: PHYSICIAN ASSISTANT

## 2024-08-20 PROCEDURE — 27520 TREAT KNEECAP FRACTURE: CPT | Performed by: PHYSICIAN ASSISTANT

## 2024-08-20 PROCEDURE — G8419 CALC BMI OUT NRM PARAM NOF/U: HCPCS | Performed by: PHYSICIAN ASSISTANT

## 2024-08-20 PROCEDURE — 3017F COLORECTAL CA SCREEN DOC REV: CPT | Performed by: PHYSICIAN ASSISTANT

## 2024-08-20 PROCEDURE — G8428 CUR MEDS NOT DOCUMENT: HCPCS | Performed by: PHYSICIAN ASSISTANT

## 2024-08-20 NOTE — PROGRESS NOTES
flexion     n    Patellar grind       y    Patellar apprehension      y    Patellar glide         n    Lachman       n    Anterior drawer      n    Pivot shift       n    Posterior drawer      n    Dial test       n    Posterolateral drawer      n    Posterior Sag       n    MCL        n    LCL          n    Medial joint line tenderness     n    Lateral joint line tenderness     n    McMurrey's         Neurological: Patient is alert and oriented to person, place, and time. Normal strenght. No sensory deficit.  Skin: Skin is warm and dry  Psychiatric: Behavior is normal. Thought content normal.  Nursing note and vitals reviewed.     Labs and Imaging:      X-rays taken in clinic today and preliminarily reviewed by me 8/20/24:  AP bilateral knees standing lateral view of the right knee demonstrates a nondisplaced fracture that extends transversely through the inferior pole of the patella.  There is no evidence of significant displacement.  This does appear to extend intra-articularly but no significant step-off.    Compared with x-rays from 8/13/2024 there is no evidence of interval displacement.       Orders Placed This Encounter   Procedures    XR KNEE RIGHT (1-2 VIEWS)     Standing Status:   Future     Number of Occurrences:   1     Standing Expiration Date:   8/20/2025       Assessment and Plan:  1. Closed nondisplaced transverse fracture of right patella, initial encounter          PLAN:  Laura Cho is a 53 y.o. old female who presents to the clinic today for for evaluation of inferior pole of the patella fracture.  Date of injury occurred in mid July approximately 4 weeks ago.  Patient unsure of exact date.  She was ambulating without any assistive devices or braces until she was evaluated at [facility] on 8/13/2024.  Patient is educated that although there is a fracture that she has been walking on regularly his knee alignment overall appears acceptable and should do well with nonoperative management given that

## 2025-04-13 ENCOUNTER — HOSPITAL ENCOUNTER (EMERGENCY)
Age: 55
Discharge: HOME | End: 2025-04-13
Attending: EMERGENCY MEDICINE
Payer: COMMERCIAL

## 2025-04-13 ENCOUNTER — APPOINTMENT (OUTPATIENT)
Dept: GENERAL RADIOLOGY | Age: 55
End: 2025-04-13
Payer: COMMERCIAL

## 2025-04-13 VITALS
RESPIRATION RATE: 18 BRPM | OXYGEN SATURATION: 97 % | DIASTOLIC BLOOD PRESSURE: 46 MMHG | TEMPERATURE: 97.7 F | HEART RATE: 54 BPM | SYSTOLIC BLOOD PRESSURE: 102 MMHG

## 2025-04-13 DIAGNOSIS — S62.002A CLOSED NONDISPLACED FRACTURE OF SCAPHOID OF LEFT WRIST, UNSPECIFIED PORTION OF SCAPHOID, INITIAL ENCOUNTER: Primary | ICD-10-CM

## 2025-04-13 PROCEDURE — 73110 X-RAY EXAM OF WRIST: CPT

## 2025-04-13 PROCEDURE — 73130 X-RAY EXAM OF HAND: CPT

## 2025-04-13 PROCEDURE — 99283 EMERGENCY DEPT VISIT LOW MDM: CPT

## 2025-04-13 ASSESSMENT — LIFESTYLE VARIABLES
HOW OFTEN DO YOU HAVE A DRINK CONTAINING ALCOHOL: NEVER
HOW MANY STANDARD DRINKS CONTAINING ALCOHOL DO YOU HAVE ON A TYPICAL DAY: PATIENT DOES NOT DRINK

## 2025-04-13 NOTE — ED PROVIDER NOTES
[facility] EMERGENCY DEPARTMENT     Emergency Department     Faculty Attestation        I performed a history and physical examination of the patient and discussed management with the resident. I reviewed the resident’s note and agree with the documented findings and plan of care. Any areas of disagreement are noted on the chart. I was personally present for the key portions of any procedures. I have documented in the chart those procedures where I was not present during the key portions. I have reviewed the emergency nurses triage note. I agree with the chief complaint, past medical history, past surgical history, allergies, medications, social and family history as documented unless otherwise noted below.    For mid-level providers such as nurse practitioners as well as physicians assistants:    I have personally seen and evaluated the patient.    I find the patient's history and physical exam are consistent with NP/PA documentation.  I agree with the care provided, treatment rendered, disposition, & follow-up plan.     Additional findings are as noted.    Vital Signs: There were no vitals taken for this visit.  PCP:  Aditya Orellana PA-C    Pertinent Comments:           Critical Care  None          Sagar Finney MD    Attending Emergency Medicine Physician            Pete Finney MD  04/13/25 [id number]

## 2025-04-13 NOTE — ED NOTES
Pt to ED AOX4, with complaints of wrist pain due to her dog jumping and pulling her as she was walking him.   Vitals obtained, call light with in reach.

## 2025-04-13 NOTE — DISCHARGE INSTRUCTIONS
You have been referred to orthopedic surgery for follow-up, call listed number to schedule an appointment.  Return to emergency department for any acute concerns.

## 2025-04-13 NOTE — ED PROVIDER NOTES
[facility] EMERGENCY DEPARTMENT  Emergency Department Encounter  Emergency Medicine Resident     Pt Name:Laura Cho  MRN: [id number]  Birthdate 1970  Date of evaluation: 4/13/25  PCP:  Aditya Orellana PA-C  Note Started: 5:35 PM EDT      CHIEF COMPLAINT       Chief Complaint   Patient presents with    Wrist Pain     Left wrist        HISTORY OF PRESENT ILLNESS  (Location/Symptom, Timing/Onset, Context/Setting, Quality, Duration, Modifying Factors, Severity.)      Laura Cho is a 54 y.o. female who presents with complaint of left-sided wrist pain after a fall yesterday.  States she was walking her dog when it jumped up causing her to fall onto the knuckles of her left hand.  Denies hitting head or loss consciousness.  Since then has had pain in the left wrist.  Reports having a couple areas of skin tear at the PIP joint area of left hand but does not want this to be evaluated, has bandages in place.  No issues with range of motion of fingers, strength, sensation.    PAST MEDICAL / SURGICAL / SOCIAL / FAMILY HISTORY      has a past medical history of Cancer (HCC), Cerebral artery occlusion with cerebral infarction (HCC), Hyperlipidemia, Psychiatric problem, and Thyroid disease.       has no past surgical history on file.      Social History     Socioeconomic History    Marital status: Single     Spouse name: Not on file    Number of children: Not on file    Years of education: Not on file    Highest education level: Not on file   Occupational History    Not on file   Tobacco Use    Smoking status: Former     Current packs/day: 0.50     Average packs/day: 0.5 packs/day for 3.0 years (1.5 ttl pk-yrs)     Types: Cigarettes    Smokeless tobacco: Never   Vaping Use    Vaping status: Never Used   Substance and Sexual Activity    Alcohol use: Not Currently    Drug use: Yes     Types: Marijuana (Weed)    Sexual activity: Not on file   Other Topics Concern    Not on file   Social History Narrative  This is a recent snapshot of the patient's [facility] medical record.  For current drug dose and complete information and questions, call [phone] or In Basket pool, [facility] ([id number])  CSN Number:  [id number]